# Patient Record
Sex: FEMALE | Race: WHITE | Employment: UNEMPLOYED
[De-identification: names, ages, dates, MRNs, and addresses within clinical notes are randomized per-mention and may not be internally consistent; named-entity substitution may affect disease eponyms.]

---

## 2017-09-20 ENCOUNTER — HOSPITAL ENCOUNTER (OUTPATIENT)
Dept: MRI IMAGING | Facility: CLINIC | Age: 62
Discharge: HOME | End: 2017-09-20
Payer: COMMERCIAL

## 2017-09-20 DIAGNOSIS — R52 PAIN: ICD-10-CM

## 2017-09-20 PROCEDURE — 73221 MRI JOINT UPR EXTREM W/O DYE: CPT

## 2020-12-10 ENCOUNTER — OFFICE VISIT (OUTPATIENT)
Dept: PRIMARY CARE CLINIC | Age: 65
End: 2020-12-10
Payer: MEDICARE

## 2020-12-10 VITALS
SYSTOLIC BLOOD PRESSURE: 128 MMHG | HEART RATE: 90 BPM | WEIGHT: 212.6 LBS | TEMPERATURE: 98.2 F | BODY MASS INDEX: 39.12 KG/M2 | OXYGEN SATURATION: 98 % | DIASTOLIC BLOOD PRESSURE: 78 MMHG | HEIGHT: 62 IN

## 2020-12-10 PROBLEM — J30.1 NON-SEASONAL ALLERGIC RHINITIS DUE TO POLLEN: Status: ACTIVE | Noted: 2020-12-10

## 2020-12-10 PROBLEM — J45.20 MILD INTERMITTENT ASTHMA WITHOUT COMPLICATION: Status: ACTIVE | Noted: 2020-12-10

## 2020-12-10 PROBLEM — M50.30 DDD (DEGENERATIVE DISC DISEASE), CERVICAL: Status: ACTIVE | Noted: 2020-12-10

## 2020-12-10 PROCEDURE — 1090F PRES/ABSN URINE INCON ASSESS: CPT | Performed by: NURSE PRACTITIONER

## 2020-12-10 PROCEDURE — 1036F TOBACCO NON-USER: CPT | Performed by: NURSE PRACTITIONER

## 2020-12-10 PROCEDURE — G8419 CALC BMI OUT NRM PARAM NOF/U: HCPCS | Performed by: NURSE PRACTITIONER

## 2020-12-10 PROCEDURE — G8484 FLU IMMUNIZE NO ADMIN: HCPCS | Performed by: NURSE PRACTITIONER

## 2020-12-10 PROCEDURE — 3017F COLORECTAL CA SCREEN DOC REV: CPT | Performed by: NURSE PRACTITIONER

## 2020-12-10 PROCEDURE — 99203 OFFICE O/P NEW LOW 30 MIN: CPT | Performed by: NURSE PRACTITIONER

## 2020-12-10 PROCEDURE — G8400 PT W/DXA NO RESULTS DOC: HCPCS | Performed by: NURSE PRACTITIONER

## 2020-12-10 PROCEDURE — G8427 DOCREV CUR MEDS BY ELIG CLIN: HCPCS | Performed by: NURSE PRACTITIONER

## 2020-12-10 PROCEDURE — 1123F ACP DISCUSS/DSCN MKR DOCD: CPT | Performed by: NURSE PRACTITIONER

## 2020-12-10 PROCEDURE — 4040F PNEUMOC VAC/ADMIN/RCVD: CPT | Performed by: NURSE PRACTITIONER

## 2020-12-10 RX ORDER — HYDROCHLOROTHIAZIDE 25 MG/1
1 TABLET ORAL DAILY
COMMUNITY
Start: 2020-09-22 | End: 2021-11-18

## 2020-12-10 RX ORDER — AZELASTINE HCL 205.5 UG/1
1 SPRAY NASAL DAILY
Qty: 30 ML | Refills: 3 | Status: SHIPPED | OUTPATIENT
Start: 2020-12-10 | End: 2021-07-06 | Stop reason: SDUPTHER

## 2020-12-10 RX ORDER — AZELASTINE HCL 205.5 UG/1
1 SPRAY NASAL DAILY
COMMUNITY
End: 2020-12-10 | Stop reason: SDUPTHER

## 2020-12-10 RX ORDER — MELOXICAM 15 MG/1
1 TABLET ORAL DAILY
COMMUNITY
Start: 2020-09-24 | End: 2021-07-06

## 2020-12-10 RX ORDER — TIZANIDINE 2 MG/1
1 TABLET ORAL DAILY PRN
COMMUNITY
Start: 2020-11-25 | End: 2020-12-10

## 2020-12-10 RX ORDER — ALUMINUM ZIRCONIUM TRICHLOROHYDREX GLY 0.19 G/G
1 STICK TOPICAL DAILY
COMMUNITY
Start: 2020-12-03 | End: 2021-03-04

## 2020-12-10 RX ORDER — TIZANIDINE 2 MG/1
2 TABLET ORAL NIGHTLY PRN
Qty: 30 TABLET | Refills: 0 | Status: SHIPPED | OUTPATIENT
Start: 2020-12-10 | End: 2021-01-15

## 2020-12-10 RX ORDER — HYDROCODONE BITARTRATE AND ACETAMINOPHEN 5; 325 MG/1; MG/1
1 TABLET ORAL DAILY PRN
COMMUNITY
Start: 2020-11-25 | End: 2021-03-16 | Stop reason: SDUPTHER

## 2020-12-10 RX ORDER — LISINOPRIL 5 MG/1
1 TABLET ORAL DAILY
COMMUNITY
Start: 2020-11-11 | End: 2021-06-11

## 2020-12-10 RX ORDER — MONTELUKAST SODIUM 10 MG/1
1 TABLET ORAL DAILY
COMMUNITY
Start: 2020-10-14 | End: 2021-04-12

## 2020-12-10 RX ORDER — FLUTICASONE PROPIONATE 50 MCG
1 SPRAY, SUSPENSION (ML) NASAL DAILY
Qty: 3 BOTTLE | Refills: 3 | Status: SHIPPED | OUTPATIENT
Start: 2020-12-10 | End: 2022-01-25 | Stop reason: SDUPTHER

## 2020-12-10 RX ORDER — FLUTICASONE PROPIONATE 50 MCG
1 SPRAY, SUSPENSION (ML) NASAL DAILY
COMMUNITY
End: 2020-12-10 | Stop reason: SDUPTHER

## 2020-12-10 ASSESSMENT — PATIENT HEALTH QUESTIONNAIRE - PHQ9
SUM OF ALL RESPONSES TO PHQ QUESTIONS 1-9: 0
SUM OF ALL RESPONSES TO PHQ QUESTIONS 1-9: 0
1. LITTLE INTEREST OR PLEASURE IN DOING THINGS: 0
2. FEELING DOWN, DEPRESSED OR HOPELESS: 0
SUM OF ALL RESPONSES TO PHQ9 QUESTIONS 1 & 2: 0
SUM OF ALL RESPONSES TO PHQ QUESTIONS 1-9: 0

## 2020-12-10 NOTE — PATIENT INSTRUCTIONS
heating pad on your skin. · Ask your doctor if you can take an over-the-counter pain medicine. These include acetaminophen (such as Tylenol) and nonsteroidal anti-inflammatory drugs, such as ibuprofen or naproxen. Your doctor can prescribe stronger medicines if needed. Be safe with medicines. Read and follow all instructions on the label. · Get some exercise every day. Exercise is one of the best ways to help your back feel better and stay better. It's best to start each exercise slowly. You may notice a little soreness, and that's okay. But if an exercise makes your pain worse, stop doing it. Here are things you can try:  ? Walking. It's the simplest and maybe the best activity for your back. It gets your blood moving and helps your muscles stay strong. ? Exercises that gently stretch and strengthen your stomach, back, and leg muscles. The stronger those muscles are, the better they're able to protect your back. Follow-up care is a key part of your treatment and safety. Be sure to make and go to all appointments, and call your doctor if you are having problems. It's also a good idea to know your test results and keep a list of the medicines you take. Where can you learn more? Go to https://Ulthera.[employer]. org and sign in to your [employer] account. Enter X386 in the [facility] box to learn more about \"Learning About Degenerative Disc Disease. \"     If you do not have an account, please click on the \"Sign Up Now\" link. Current as of: March 2, 2020               Content Version: 12.6  © 2006-2020 [employer]. Care instructions adapted under license by [facility] ([facility]). If you have questions about a medical condition or this instruction, always ask your healthcare professional. [place] [address] any warranty or liability for your use of this information.          Patient Education        Cervical Disc Disease: Care Instructions  Your Care Instructions Cervical disc disease results from damage, disease, or wear and tear to the discs between the bones (vertebra) in your neck. The discs act as shock absorbers for the spine and keep the spine flexible. When a disc is damaged, it can bulge out and press against the nerve roots or spinal cord. This is sometimes called a herniated or \"slipped disc. \" This pressure can cause pain and numbness or tingling in your arms and hands. It can also cause weakness in your legs. An accident can damage a disc and cause it to break open (rupture). Aging and hard physical work can also cause damage to cervical discs. The first treatments for cervical disc disease include physical therapy, special neck exercises, heat, and pain medicine. If these fail, your doctor may inject steroids and pain medicine into your neck. Surgery is usually done only if other treatments have not worked. Follow-up care is a key part of your treatment and safety. Be sure to make and go to all appointments, and call your doctor if you are having problems. It's also a good idea to know your test results and keep a list of the medicines you take. How can you care for yourself at home? · Take pain medicines exactly as directed. ? If the doctor gave you a prescription medicine for pain, take it as prescribed. ? If you are not taking a prescription pain medicine, ask your doctor if you can take an over-the-counter medicine. · Don't spend too long in one position. Take short breaks to move around and change positions. · Wear a seat belt and shoulder harness when you are in a car. · Sleep with a pillow under your head and neck that keeps your neck straight. · Follow your doctor's instructions for gentle neck-stretching exercises. · Do not smoke. Smoking can slow healing of your discs. If you need help quitting, talk to your doctor about stop-smoking programs and medicines. These can increase your chances of quitting for good.   · Avoid strenuous work or exercise until your doctor says it is okay. When should you call for help? Call 911 anytime you think you may need emergency care. For example, call if:    · You are unable to move an arm or a leg at all. Call your doctor now or seek immediate medical care if:    · You have new or worse symptoms in your arms, legs, belly, or buttocks. Symptoms may include:  ? Numbness or tingling. ? Weakness. ? Pain.     · You lose bladder or bowel control. Watch closely for changes in your health, and be sure to contact your doctor if:    · You do not get better as expected. Where can you learn more? Go to https://Elevation [employer]. org and sign in to your [employer] account. Enter N118 in the [place] box to learn more about \"Cervical Disc Disease: Care Instructions. \"     If you do not have an account, please click on the \"Sign Up Now\" link. Current as of: March 2, 2020               Content Version: 12.6  © 2006-2020 [employer]. Care instructions adapted under license by [facility] ([facility]). If you have questions about a medical condition or this instruction, always ask your healthcare professional. [address] any warranty or liability for your use of this information.

## 2020-12-10 NOTE — PROGRESS NOTES
Lars Henson [address] [place] [address]  [place] Booth [address]  Dept: [phone]     Patient is here to establish care. Here for evaluation of the following medical concerns: Follow-up (neck pain, since Nov 24,starting to lighten up)    Pooja Wayne is a 76 y.o. female who presents to the office today for a first visit and to establish a relationship with a new primary care provider. Pt here for f.u after ER visit. Records obtained from [facility]. Pt went to ER due to having severe pain on the left side of her neck with muscle spasm and radiation to her left trapezius. CT showed moderate cervical DDD. Pt was given flexeril and norco. Pt has been taking flexeril at night to assist with sleeping and it does seem to help. She isn't taking the norco unless the pain becomes really bad, hasn't had to take it that often. She has been doing at home stretches and avoiding strenuous activity or heavy lifting. I recommend an MRI to further investigate and pt is agreeable. She does need a refill on flexeril which we will provide. Will f.u in a few weeks after MRI is completed. She also is requesting refills of albuterol inhaler she uses PRN and two nasal sprays for allergic rhinitis. Patient Active Problem List    Diagnosis Date Noted    Mild intermittent asthma without complication 75/22/8453    Non-seasonal allergic rhinitis due to pollen 12/10/2020    DDD (degenerative disc disease), cervical 12/10/2020     Patient's BMI is Body mass index is 38.89 kg/m². Social History     Tobacco Use    Smoking status: Never Smoker    Smokeless tobacco: Never Used   Substance Use Topics    Alcohol use: Not Currently    Drug use: Never      History reviewed. No pertinent past medical history. PHQ-9 Total Score: 0 (12/10/2020  1:15 PM)     Immunization:    There is no immunization history on file for this patient. History reviewed. No pertinent surgical history. History reviewed. No pertinent family history. Current Outpatient Medications   Medication Sig Dispense Refill    PRILOSEC OTC 20 MG tablet Take 1 tablet by mouth daily      montelukast (SINGULAIR) 10 MG tablet Take 1 tablet by mouth daily      meloxicam (MOBIC) 15 MG tablet Take 1 tablet by mouth daily      lisinopril (PRINIVIL;ZESTRIL) 5 MG tablet Take 1 tablet by mouth daily      HYDROcodone-acetaminophen (NORCO) 5-325 MG per tablet Take 1 tablet by mouth daily as needed.  hydroCHLOROthiazide (HYDRODIURIL) 25 MG tablet Take 1 tablet by mouth daily      azelastine HCl 0.15 % SOLN 1 spray by Nasal route daily 30 mL 3    fluticasone (FLONASE) 50 MCG/ACT nasal spray 1 spray by Each Nostril route daily 3 Bottle 3    albuterol sulfate (PROAIR RESPICLICK) 306 (90 Base) MCG/ACT aerosol powder inhalation Inhale 2 puffs into the lungs every 4 hours as needed for Wheezing or Shortness of Breath 3 Inhaler 3    tiZANidine (ZANAFLEX) 2 MG tablet Take 1 tablet by mouth nightly as needed (neck pain) 30 tablet 0     No current facility-administered medications for this visit. The patient's past medical, surgical, social, and family history as well as her current medications and allergies were reviewed as documented in today's encounter. Review of Systems   Constitutional: Negative. HENT: Negative. Eyes: Negative. Respiratory: Negative. Cardiovascular: Negative. Gastrointestinal: Negative. Endocrine: Negative. Genitourinary: Negative. Musculoskeletal: Positive for neck pain. Skin: Negative. Allergic/Immunologic: Positive for environmental allergies. Neurological: Negative. Hematological: Negative. Psychiatric/Behavioral: Negative. All other systems reviewed and are negative.      /78 (Site: Left Upper Arm, Position: Sitting, Cuff Size: Large Adult)   Pulse 90   Temp 98.2 °F (36.8 °C) (Temporal)   Ht 5' 2\" (1.575 m)   Wt 212 lb 9.6 oz (96.4 kg)   SpO2 98%   BMI 38.89 kg/m²   Physical Exam  Vitals signs and nursing note reviewed. Constitutional:       Appearance: Normal appearance. HENT:      Right Ear: Tympanic membrane, ear canal and external ear normal.      Left Ear: Tympanic membrane, ear canal and external ear normal.      Nose: Nose normal.      Mouth/Throat:      Mouth: Mucous membranes are moist.      Pharynx: No oropharyngeal exudate. Eyes:      Extraocular Movements: Extraocular movements intact. Conjunctiva/sclera: Conjunctivae normal.      Pupils: Pupils are equal, round, and reactive to light. Neck:      Musculoskeletal: Normal range of motion. Cardiovascular:      Rate and Rhythm: Normal rate and regular rhythm. Pulses: Normal pulses. Heart sounds: Normal heart sounds. Pulmonary:      Effort: Pulmonary effort is normal. No respiratory distress. Breath sounds: Normal breath sounds. No stridor. No wheezing. Abdominal:      General: Abdomen is flat. Bowel sounds are normal.      Palpations: Abdomen is soft. Tenderness: There is no abdominal tenderness. Musculoskeletal:      Cervical back: She exhibits decreased range of motion, bony tenderness and pain. Skin:     General: Skin is warm and dry. Capillary Refill: Capillary refill takes less than 2 seconds. Neurological:      General: No focal deficit present. Mental Status: She is alert. Psychiatric:         Mood and Affect: Mood normal.         Behavior: Behavior normal.         Thought Content:  Thought content normal.       No results found for: WBC, HGB, HCT, MCV, PLT  No results found for: NA, K, CL, CO2, BUN, CREATININE, GLUCOSE, CALCIUM   No results found for: ALT, AST, GGT, ALKPHOS, BILITOT  No results found for: TSHFT4, TSH  No results found for: CHOL  No results found for: TRIG  No results found for: HDL  No results found for: LDLCALC, LDLCHOLESTEROL  No results found for: CHOLHDLRATIO  No results found for: LABA1C  No results found for: FYDTAMLM69  No results found for: FOLATE  No results found for: IRON, TIBC, FERRITIN  No results found for: VITD25  I personally reviewed testing with patient. Otherwise labs within normal limits  ASSESSMENT AND PLAN  1. Mild intermittent asthma without complication    - albuterol sulfate (PROAIR RESPICLICK) 607 (90 Base) MCG/ACT aerosol powder inhalation; Inhale 2 puffs into the lungs every 4 hours as needed for Wheezing or Shortness of Breath  Dispense: 3 Inhaler; Refill: 3    2. Non-seasonal allergic rhinitis due to pollen    - azelastine HCl 0.15 % SOLN; 1 spray by Nasal route daily  Dispense: 30 mL; Refill: 3  - fluticasone (FLONASE) 50 MCG/ACT nasal spray; 1 spray by Each Nostril route daily  Dispense: 3 Bottle; Refill: 3    3. DDD (degenerative disc disease), cervical    - MRI CERVICAL SPINE WO CONTRAST; Future  - tiZANidine (ZANAFLEX) 2 MG tablet; Take 1 tablet by mouth nightly as needed (neck pain)  Dispense: 30 tablet;  Refill: 0     Orders Placed This Encounter   Procedures    MRI CERVICAL SPINE WO CONTRAST     Standing Status:   Future     Standing Expiration Date:   12/10/2021     Orders Placed This Encounter   Medications    azelastine HCl 0.15 % SOLN     Si spray by Nasal route daily     Dispense:  30 mL     Refill:  3    fluticasone (FLONASE) 50 MCG/ACT nasal spray     Si spray by Each Nostril route daily     Dispense:  3 Bottle     Refill:  3    albuterol sulfate (PROAIR RESPICLICK) 843 (90 Base) MCG/ACT aerosol powder inhalation     Sig: Inhale 2 puffs into the lungs every 4 hours as needed for Wheezing or Shortness of Breath     Dispense:  3 Inhaler     Refill:  3    tiZANidine (ZANAFLEX) 2 MG tablet     Sig: Take 1 tablet by mouth nightly as needed (neck pain)     Dispense:  30 tablet     Refill:  0      Data Unavailable  Health Maintenance Due   Topic Date Due    Potassium monitoring  1955    Creatinine monitoring  1955    Hepatitis C screen  1955    DTaP/Tdap/Td vaccine (1 -

## 2020-12-11 ASSESSMENT — ENCOUNTER SYMPTOMS
GASTROINTESTINAL NEGATIVE: 1
RESPIRATORY NEGATIVE: 1
EYES NEGATIVE: 1

## 2020-12-14 ENCOUNTER — HOSPITAL ENCOUNTER (OUTPATIENT)
Dept: MRI IMAGING | Facility: CLINIC | Age: 65
Discharge: HOME | End: 2020-12-16
Payer: MEDICARE

## 2020-12-14 PROCEDURE — 72141 MRI NECK SPINE W/O DYE: CPT

## 2020-12-16 ENCOUNTER — TELEPHONE (OUTPATIENT)
Dept: PRIMARY CARE CLINIC | Age: 65
End: 2020-12-16

## 2020-12-16 RX ORDER — ALBUTEROL SULFATE 2.5 MG/3ML
2.5 SOLUTION RESPIRATORY (INHALATION) EVERY 6 HOURS PRN
Qty: 120 EACH | Refills: 3 | Status: SHIPPED | OUTPATIENT
Start: 2020-12-16

## 2020-12-17 RX ORDER — ALBUTEROL SULFATE 90 UG/1
2 AEROSOL, METERED RESPIRATORY (INHALATION) EVERY 6 HOURS PRN
Qty: 3 INHALER | Refills: 0 | Status: SHIPPED | OUTPATIENT
Start: 2020-12-17

## 2020-12-17 RX ORDER — ALBUTEROL SULFATE 90 UG/1
2 AEROSOL, METERED RESPIRATORY (INHALATION) EVERY 6 HOURS PRN
COMMUNITY
End: 2020-12-17 | Stop reason: SDUPTHER

## 2020-12-17 NOTE — TELEPHONE ENCOUNTER
Verified with Dr. Farhat Huang the approval to send the correct inhaler, nebulizer solution was accidentally order last visit

## 2020-12-18 ENCOUNTER — HOSPITAL ENCOUNTER (OUTPATIENT)
Dept: PHYSICAL THERAPY | Facility: CLINIC | Age: 65
Setting detail: THERAPIES SERIES
Discharge: HOME | End: 2020-12-18
Payer: MEDICARE

## 2020-12-18 PROCEDURE — 97161 PT EVAL LOW COMPLEX 20 MIN: CPT

## 2020-12-18 PROCEDURE — 97140 MANUAL THERAPY 1/> REGIONS: CPT

## 2020-12-18 PROCEDURE — 97110 THERAPEUTIC EXERCISES: CPT

## 2020-12-18 NOTE — PRE-CERTIFICATION NOTE
Medicare Cap   [] Physical Therapy  [] Speech Therapy  [] Occupational therapy  *PT and Speech caps combine      $2040 Cap limit < kx modifier needed        Patient Name: Sarah Peralta  YOB: 1955    Note:  This is an estimate of charges billed.      Date of [address] Name # units/ charge $$$ charge Daily Total Charge Ongoing Total $$$   12/18 PT eval  Therex  Manual 1+1+1 82.82+23.22+21.70 127.74 127.74

## 2020-12-18 NOTE — PRE-CERTIFICATION NOTE
Jovan Fall Risk Assessment    Patient Name:  Sheyla Bennett  : 1955        Risk Factor Scale  Score   History of Falls [] Yes  [x] No 25  0    Secondary Diagnosis [] Yes  [x] No 15  0    Ambulatory Aid [] Furniture  [] Crutches/cane/walker  [x] None/bedrest/wheelchair/nurse 30  15  0    IV/Heparin Lock [] Yes  [x] No 20  0    Gait/Transferring [] Impaired  [] Weak  [x] Normal/bedrest/immobile 20  10  0    Mental Status [] Forgets limitations  [x] Oriented to own ability 15  0       Total: 0     Based on the Assessment score: check the appropriate box.     [x]  No intervention needed   Low =   Score of 0-24    []  Use standard prevention interventions Moderate =  Score of 24-44   [] Give patient handout and discuss fall prevention strategies   [] Establish goal of education for patient/family RE: fall prevention strategies    []  Use high risk prevention interventions High = Score of 45 and higher   [] Give patient handout and discuss fall prevention strategies   [] Establish goal of education for patient/family Re: fall prevention strategies   [] Discuss lifeline / other resources    Electronically signed by:   Linda Castillo, PT  Date: 2020

## 2020-12-18 NOTE — CONSULTS
[] [address]  [address]  [phone]  F: [phone] [] [address]  [facility]   [address]  P: [phone]  F: [phone] [] [address]  [facility]  P: [phone]  F: [phone] [] [address]  [facility]   [address]   [place]: [phone]  F: [phone]  [x] [address]  P: [phone]  F: [phone]      Physical Therapy Spine Evaluation    Date:  2020  Patient: Humberto Davis  :  1955  MRN: [id number]  Physician: Penny Ulrich CNP   Insurance: Medicare  Medical Diagnosis: DDD Cervical Spine  Rehab Codes: M50.30  Onset date: ~20  Next Dr's appt. : 3 month follow     Subjective:   CC: Pt states pain had been intermittment for months, until one day in November when the pain was so intense pt went to the ER. Pt received muscle relaxors which significantly improved pain, however constant pain did remain. Pt went to PCP who had MRI ordered and sent to PT. Pt also reports numbness/tinging into L shoulder, does not radiate into forearm or hands.          PMHx: [] Unremarkable [] Diabetes [] HTN  [] Pacemaker   [] MI/Heart Problems [] Cancer [] Arthritis [] Other:              [] Refer to full medical chart  In EPIC         Tests: [] X-Ray: [x] MRI:   Multilevel degenerative disc disease with mild to moderate right foraminal   narrowing at C5-6.       [] Other:    Medications: [] Refer to full medical record [] None [] Other:  Allergies:      [] Refer to full medical record [] None [] Other:    Function:      Marital Status    Home type    Stairs from outside            Hand rail    Stairs inside            Hand rail    Employement Homemaker    Job status    Work Activities/duties     Recreational Activities Difficulty with cooking d/t prolonged standing        Pain present? Yes    Location L side cervical    Pain Rating currently 2-3/10   Pain at worse 10/10   Pain at best 1/10   Description of pain Constant ache with shooting intermittmently    Altered Sensation N/T at times   What makes it worse Prolonged standing, vacuuming    What makes it better Rest, heat   Symptom progression Improving    Sleep Difficult to find a comfortable position                Objective:      STRENGTH  ROM    Left Right Cervical    C5 Shld Abd 4/5 pain 5/5 Flexion WL   Shld Flexion 4+/5 5/5 Extension Limited 75% pain   Shld IR 5/5 5/5 Rotation L Limited 25% pain R WFL   Shld ER 5/5 5/5 Sidebend L Limited 25% R Limited 25%         OBSERVATION No Deficit Deficit Not Tested Comments   Posture       Forward Head [] [] []    Rounded Shoulders [] [] []    Kyphosis [] [] []    Lordosis [] [] []    Lateral Shift [] [] []    Scoliosis [] [] []    Iliac Crest [] [] []    PSIS [] [] []    ASIS [] [] []    Genu Valgus [] [] []    Genu Varus [] [] []    Genu Recurvatum [] [] []    Pronation [] [] []    Supination [] [] []    Leg Length Discrp [] [] []    Slumped Sitting [] [] []    Palpation [] [x] [] Significant tenderness with palpation noted in LUT into L levator   Sensation [] [x] [] No deficits currently, however does report intermittent n/t L UT   Edema [] [] []    Neurological [] [] []              Somatic Dysfunctions Normal Deficit Details   Cervical   [] [x] Difficult to access somatic dysfunction d/t lack of extension and body habitus. TTP C4/C5  Suboccipital release    Thoracic   [] []    Rib   [] []    Pelvis   [] []    Lumbar [] []    SI   [] []      Functional Test: Neck Disability Index Score: 27.5% functionally impaired         Assessment:  Patient would benefit from skilled physical therapy services in order to: Decrease UT and levator tightness in order to decrease cervical pain. *UT stretch 3x30\"     *Levator stretch 3x30\"     *Seated scapular retraction 2x10                                                                 Other: Manual: Subocciptial release, DI L levator     Integrative Dry Needling: L UT, C6/C7 paravertebrals. Homeostatic point: Spinal Accessory. Initiated Dry Needling this date with all risks and benefits explained, no adverse effects noted post.     Specific Instructions for next treatment: Continue with manual as listed above, possible hypervolt as well     Evaluation Complexity:  History (Personal factors, comorbidities) [x] 0 [] 1-2 [] 3+   Exam (limitations, restrictions) [x] 1-2 [] 3 [] 4+   Clinical presentation (progression) [x] Stable [] Evolving  [] Unstable   Decision Making [x] Low [] Moderate [] High    [x] Low Complexity [] Moderate Complexity [] High Complexity       Treatment Charges: Mins Units   [x] Evaluation       [x]  Low       []  Moderate       []  High 20 1   []  Modalities     [x]  Ther Exercise 10 1   [x]  Manual Therapy 15 1   []  Ther Activities     []  Aquatics     []  Vasocompression     [x]  Other: Dry Needling 5 0     TOTAL TREATMENT TIME: 50 minutes    Time in: 0810   Time Out: 900    Electronically signed by: Angela Masters PT        Physician Signature:________________________________Date:__________________  By signing above or cosigning this note, I have reviewed this plan of care and certify a need for medically necessary rehabilitation services.      *PLEASE SIGN ABOVE AND FAX BACK ALL PAGES*

## 2020-12-21 ENCOUNTER — HOSPITAL ENCOUNTER (OUTPATIENT)
Dept: PHYSICAL THERAPY | Facility: CLINIC | Age: 65
Setting detail: THERAPIES SERIES
Discharge: HOME | End: 2020-12-21
Payer: MEDICARE

## 2020-12-21 PROCEDURE — 97140 MANUAL THERAPY 1/> REGIONS: CPT

## 2020-12-21 PROCEDURE — 97110 THERAPEUTIC EXERCISES: CPT

## 2020-12-21 NOTE — FLOWSHEET NOTE
[] [address]  [address]  [phone]  F: [phone] [] [address]  [facility]   [address]  P: [phone]  F: [phone] [] [place]  [address]  P: [phone]  F: [phone] [] [address]  P: [phone]  F: [phone] [] [address]  [facility]   [address]   [place]: [phone]  F: [phone]      Physical Therapy Daily Treatment Note    Date:  2020  Patient Name:  Jakob Manzano    :  1955  MRN: [id number]  Physician: Laci Cantu CNP                                   Insurance: Medicare  Medical Diagnosis: DDD Cervical Spine                  Rehab Codes: M50.30  Onset date: ~20               Next 's appt. : 3 month follow   Visit# / total visits: ; Progress note for Medicare patient due at visit 10     Cancels/No Shows: 0/0    Subjective:    Pain:  [x] Yes  [] No Location: L sided neck Pain Rating: (0-10 scale) 2/10  Pain altered Tx:  [] No  [] Yes  Action:  Comments: Pt arrives stating to having decreased neck pain after initial evaluation. Currently only complaints is stiffness. Objective:  Exercises:  Exercise  L sided cervical radiculopathy     Reps/ Time Weight/ Level Comments                       *UT stretch 3x30\"       *Levator stretch 3x30\"       *Seated scapular retraction 2x10                                                                                                           Other: Manual:  DI L levator, hypervolt to upper thoracic paraspinals unable to tolerate hypervolt to UT or levator d/t pain, UT      Integrative Dry Needling: L UT, C6/C7 paravertebrals.  Homeostatic point: Spinal Accessory, Dorsal Scapular     Specific

## 2020-12-30 ENCOUNTER — HOSPITAL ENCOUNTER (OUTPATIENT)
Dept: PHYSICAL THERAPY | Facility: CLINIC | Age: 65
Setting detail: THERAPIES SERIES
Discharge: HOME | End: 2020-12-30
Payer: MEDICARE

## 2020-12-30 PROCEDURE — 97140 MANUAL THERAPY 1/> REGIONS: CPT

## 2020-12-30 PROCEDURE — 97110 THERAPEUTIC EXERCISES: CPT

## 2020-12-30 NOTE — FLOWSHEET NOTE
[] [address]  [address]  [phone]  F: [phone] [] [address]  [facility] 36   [address]  P: [phone]  F: [phone] [] [address]  [address]  P: [phone]  F: [phone] [] [address]  P: [phone]  F: [phone] [] [address]  [facility]   [address]   [place]: [phone]  F: [phone]      Physical Therapy Daily Treatment Note    Date:  2020  Patient Name:  Zaina Farah    :  1955  MRN: [id number]  Physician: Reese Keller CNP                                   Insurance: Medicare  Medical Diagnosis: DDD Cervical Spine                  Rehab Codes: M50.30  Onset date: ~20               Next 's appt. : 3 month follow   Visit# / total visits: ; Progress note for Medicare patient due at visit 10     Cancels/No Shows: 0/0    Subjective:    Pain:  [x] Yes  [] No Location: L sided neck Pain Rating: (0-10 scale) 2/10  Pain altered Tx:  [] No  [] Yes  Action:  Comments: Pt arrives stating to again having decreased pain, only continues to having stiffness. Objective:  Exercises:  Exercise  L sided cervical radiculopathy     Reps/ Time Weight/ Level Comments                       *UT stretch 3x30\"       *Levator stretch 3x30\"       *Seated scapular retraction 2x10                 Theraband midrow, shoulder ext, horizontal abd 2x10                                                                                       Other: Manual:  DI L levator, hypervolt to upper thoracic paraspinals and L UT, levator with light pressure       Integrative Dry Needling: L UT, C6/C7 paravertebrals.  Homeostatic point: Spinal Accessory     Specific Instructions for next treatment: Continue with manual as listed above, progress postural stabilization strengthening as tolerated. Treatment Charges: Mins Units   []  Modalities     [x]  Ther Exercise 10 1   [x]  Manual Therapy 25 2   []  Ther Activities     []  Aquatics     []  Vasocompression     [x]  Other: Dry Needling 5 0   Total Treatment time 40 3       Assessment: [x] Progressing toward goals. Pt with continued improvement in pain and stiffness post manual this date. Able to increase strengthening via adding theraband scapular retractor strengthening, which pt tolerated without any increased neck soreness. [] No change. [] Other:  [x] Patient would continue to benefit from skilled physical therapy services in order to: Decrease UT and levator tightness in order to decrease cervical pain.            Goals:                               STG: (to be met in 10 treatments)  1. ? Pain: Decrease pain levels to 4/10 at worst  2. ? ROM: Increase flexibility and AROM limitations throughout to equal bilat to reduce difficulty with ADLs, increase cervical extension to only 25% impaired and without pain. 3. ? Strength: Increase L shoulder abd and flex strength to 5/5  4. ? Function: Pt to report increased ability to cook without pain  5. Independent with Home Exercise Programs  6. Demonstrate Knowledge of fall prevention     LTG: (to be met in 20 treatments)  1. Improve score on assessment tool from 27.5% impaired to 10% impaired, demonstrating an improvement in ADLs  2. Reduce pain levels to 0/10    Pt. Education:  [x] Yes  [] No  [] Reviewed Prior HEP/Ed  Method of Education: [x] Verbal  [x] Demo  [x] Written  Comprehension of Education:  [x] Verbalizes understanding. [x] Demonstrates understanding. [x] Needs review. [] Demonstrates/verbalizes HEP/Ed previously given. Plan: [x] Continue current frequency toward long and short term goals.           Time In: 1500            Time Out: 1540    Electronically signed by: Jeffery Nix, PT

## 2021-01-04 ENCOUNTER — HOSPITAL ENCOUNTER (OUTPATIENT)
Dept: PHYSICAL THERAPY | Facility: CLINIC | Age: 66
Setting detail: THERAPIES SERIES
Discharge: HOME | End: 2021-01-04
Payer: MEDICARE

## 2021-01-04 PROCEDURE — 97140 MANUAL THERAPY 1/> REGIONS: CPT

## 2021-01-04 NOTE — FLOWSHEET NOTE
[] [address]  [address]  [phone]  F: [phone] [] [address]  [facility]   [address]  P: [phone]  F: [phone] [] [place]  [address]  P: [phone]  F: [phone] [] [address]  P: [phone]  F: [phone] [] [address]  [facility]   [address]   [place]: [phone]  F: [phone]      Physical Therapy Daily Treatment Note    Date:  2021  Patient Name:  Jacinto Sinclair    :  1955  MRN: [id number]  Physician: Jolene Casarez CNP                                   Insurance: Medicare  Medical Diagnosis: DDD Cervical Spine                  Rehab Codes: M50.30  Onset date: ~20               Next 's appt. : 3 month follow   Visit# / total visits: ; Progress note for Medicare patient due at visit 10     Cancels/No Shows: 0/0    Subjective:    Pain:  [x] Yes  [] No Location: L sided neck Pain Rating: (0-10 scale) 0/10  Pain altered Tx:  [] No  [] Yes  Action:  Comments: Pt arrives stating to having increased soreness after last visit, however pain subsided after two days. Pt currently arrives in no pain.      Objective:  Exercises:  Exercise  L sided cervical radiculopathy     Reps/ Time Weight/ Level Comments   UBE 5'                 *UT stretch 3x30\"       *Levator stretch 3x30\"       *Seated scapular retraction 2x10                 Theraband midrow, shoulder ext, horizontal abd 2x10    held today d/t increased soreness after last visit                                                                                    Other: Manual:  DI L levator, hypervolt to upper thoracic paraspinals and L UT, levator with light pressure       Integrative Dry Needling: L UT, C6/C7 paravertebrals. Homeostatic point: Spinal Accessory     Specific Instructions for next treatment: Continue with manual as listed above, progress postural stabilization strengthening as tolerated. Treatment Charges: Mins Units   []  Modalities     [x]  Ther Exercise 5 0   [x]  Manual Therapy 25 2   []  Ther Activities     []  Aquatics     []  Vasocompression     [x]  Other: Dry Needling 5 0   Total Treatment time 35 2       Assessment: [x] Progressing toward goals. Held strengthening this date d/t pt reporting increased soreness after last visit. Continued with manual and stretching which pt tolerated well with decreased stiffness post.     [] No change. [] Other:  [x] Patient would continue to benefit from skilled physical therapy services in order to: Decrease UT and levator tightness in order to decrease cervical pain.            Goals:                               STG: (to be met in 10 treatments)  1. ? Pain: Decrease pain levels to 4/10 at worst  2. ? ROM: Increase flexibility and AROM limitations throughout to equal bilat to reduce difficulty with ADLs, increase cervical extension to only 25% impaired and without pain. 3. ? Strength: Increase L shoulder abd and flex strength to 5/5  4. ? Function: Pt to report increased ability to cook without pain  5. Independent with Home Exercise Programs  6. Demonstrate Knowledge of fall prevention     LTG: (to be met in 20 treatments)  1. Improve score on assessment tool from 27.5% impaired to 10% impaired, demonstrating an improvement in ADLs  2. Reduce pain levels to 0/10    Pt. Education:  [x] Yes  [] No  [] Reviewed Prior HEP/Ed  Method of Education: [x] Verbal  [x] Demo  [x] Written  Comprehension of Education:  [x] Verbalizes understanding. [x] Demonstrates understanding. [x] Needs review. [] Demonstrates/verbalizes HEP/Ed previously given. Plan: [x] Continue current frequency toward long and short term goals.           Time In: 1

## 2021-01-06 ENCOUNTER — HOSPITAL ENCOUNTER (OUTPATIENT)
Dept: PHYSICAL THERAPY | Facility: CLINIC | Age: 66
Setting detail: THERAPIES SERIES
Discharge: HOME | End: 2021-01-06
Payer: MEDICARE

## 2021-01-06 PROCEDURE — 97140 MANUAL THERAPY 1/> REGIONS: CPT

## 2021-01-06 NOTE — PRE-CERTIFICATION NOTE
Medicare Cap   [] Physical Therapy  [] Speech Therapy  [] Occupational therapy  *PT and Speech caps combine      $2040 Cap limit < kx modifier needed        Patient Name: Jose Daniel Membreno  YOB: 1955    Note:  This is an estimate of charges billed.      Date of [address] Name # units/ charge $$$ charge Daily Total Charge Ongoing Total $$$   12/18 PT eval  Therex  Manual 1+1+1 82.82+23.22+21.70 127.74 127.74   12/21 Therex  Manual 1+1 29.72+21.70 51.44 179.18   12/30 Therex  Manual 2+1 29.72+23.22+21.70 74.64 253.82           1/4/21 Manual 2 29.72+23.22 52.94 52.94   1/6/21 Manual 2 29.72+23.22 52.94 105.88

## 2021-01-06 NOTE — FLOWSHEET NOTE
[] [address]  [address]  [phone]  F: [phone] [] [address]  [facility]   [address]  P: [phone]  F: [phone] [] [place]  [address]  P: [phone]  F: [phone] [] [address]  P: [phone]  F: [phone] [] [address]  [facility]   [address]   [place]: [phone]  F: [phone]      Physical Therapy Daily Treatment Note    Date:  2021  Patient Name:  Laura Fregoso    :  1955  MRN: [id number]  Physician: Gwyn Best CNP                                   Insurance: Medicare  Medical Diagnosis: DDD Cervical Spine                  Rehab Codes: M50.30  Onset date: ~20               Next 's appt. : 3 month follow   Visit# / total visits: ; Progress note for Medicare patient due at visit 10     Cancels/No Shows: 0/0    Subjective:    Pain:  [x] Yes  [] No Location:  N/A   Pain Rating: (0-10 scale) 0/10  Pain altered Tx:  [] No  [] Yes  Action:  Comments: Pt arrives stating to feeling great after last visit, and having no soreness. Pt also arrives without any pain currently.      Objective:  Exercises:  Exercise  L sided cervical radiculopathy     Reps/ Time Weight/ Level Comments   UBE 5'                 *UT stretch 3x30\"       *Levator stretch 3x30\"       *Seated scapular retraction 2x10                 Theraband midrow, shoulder ext, horizontal abd 2x10    hold              Standing active shoulder flex and horiztonal abd 2x10    cues to decrease ROM in order to avoid UT compensation with good follow through                                                                Other: Manual:  DI L levator and UT, hypervolt to upper thoracic paraspinals and L UT, levator with light pressure       Integrative Dry Needling: L UT, C6/C7 paravertebrals. Homeostatic point: Spinal Accessory     Specific Instructions for next treatment: Continue with manual as listed above, progress postural stabilization strengthening as tolerated. Treatment Charges: Mins Units   []  Modalities     [x]  Ther Exercise 5 0   [x]  Manual Therapy 25 2   []  Ther Activities     []  Aquatics     []  Vasocompression     [x]  Other: Dry Needling 5 0   Total Treatment time 35 2       Assessment: [x] Progressing toward goals. Reintegrated gentle stretnghtnign this date in order progress postural stabilization exercises. Pt able to complete all exercises as AROM, noting minor bilateral shoulder muscle soreness for last set of horizontal abduction, however no increase in neck pain noted. Will continue with manual and progress strengthening as tolerated. [] No change. [] Other:  [x] Patient would continue to benefit from skilled physical therapy services in order to: Decrease UT and levator tightness in order to decrease cervical pain.            Goals:                               STG: (to be met in 10 treatments)  1. ? Pain: Decrease pain levels to 4/10 at worst  2. ? ROM: Increase flexibility and AROM limitations throughout to equal bilat to reduce difficulty with ADLs, increase cervical extension to only 25% impaired and without pain. 3. ? Strength: Increase L shoulder abd and flex strength to 5/5  4. ? Function: Pt to report increased ability to cook without pain  5. Independent with Home Exercise Programs  6. Demonstrate Knowledge of fall prevention     LTG: (to be met in 20 treatments)  1. Improve score on assessment tool from 27.5% impaired to 10% impaired, demonstrating an improvement in ADLs  2. Reduce pain levels to 0/10    Pt.  Education:  [x] Yes  [] No  [] Reviewed Prior HEP/Ed  Method of Education: [x] Verbal  [x] Demo  [x] Written  Comprehension of Education:  [x] Verbalizes understanding. [x] Demonstrates understanding. [x] Needs review. [] Demonstrates/verbalizes HEP/Ed previously given. Plan: [x] Continue current frequency toward long and short term goals.           Time In: 1000            Time Out:  1035    Electronically signed by:  Daniele Stanley, PT

## 2021-01-11 ENCOUNTER — HOSPITAL ENCOUNTER (OUTPATIENT)
Dept: PHYSICAL THERAPY | Facility: CLINIC | Age: 66
Setting detail: THERAPIES SERIES
Discharge: HOME | End: 2021-01-11
Payer: MEDICARE

## 2021-01-11 PROCEDURE — 97140 MANUAL THERAPY 1/> REGIONS: CPT

## 2021-01-11 NOTE — FLOWSHEET NOTE
[] [address]  [address]  [phone]  F: [phone] [] [address]  [facility] 36   [address]  P: [phone]  F: [phone] [] [address]  [address]  P: [phone]  F: [phone] [] [address]  P: [phone]  F: [phone] [] [address]  [facility]   [address]   [place]: [phone]  F: [phone]      Physical Therapy Daily Treatment Note    Date:  2021  Patient Name:  Lady Dunlap    :  1955  MRN: [id number]  Physician: Tanner Mauricio CNP                                   Insurance: Medicare  Medical Diagnosis: DDD Cervical Spine                  Rehab Codes: M50.30  Onset date: ~20               Next 's appt. : 3 month follow   Visit# / total visits: ; Progress note for Medicare patient due at visit 10     Cancels/No Shows: 0/0    Subjective:    Pain:  [x] Yes  [] No Location:  N/A   Pain Rating: (0-10 scale) 0/10  Pain altered Tx:  [] No  [] Yes  Action:  Comments: Pt again arrives with no pain, voices satisfaction with therapy so far.      Objective:  Exercises:  Exercise  L sided cervical radiculopathy     Reps/ Time Weight/ Level Comments   UBE 5'    not today              *UT stretch 3x30\"       *Levator stretch 3x30\"       *Seated scapular retraction 2x10                 Theraband midrow, shoulder ext, horizontal abd 2x10    hold              Standing active shoulder flex and horiztonal abd 2x10    cues to decrease ROM in order to avoid UT compensation with good follow through                                                                Other: Manual:  DI L levator and UT, hypervolt to upper thoracic paraspinals and L UT, levator with light pressure - understanding. [x] Demonstrates understanding. [x] Needs review. [] Demonstrates/verbalizes HEP/Ed previously given. Plan: [x] Continue current frequency toward long and short term goals.           Time In: 0896            Time Out:  1030    Electronically signed by:  Daniele Stanley, PT

## 2021-01-13 ENCOUNTER — APPOINTMENT (OUTPATIENT)
Dept: PHYSICAL THERAPY | Facility: CLINIC | Age: 66
End: 2021-01-13
Payer: MEDICARE

## 2021-01-15 DIAGNOSIS — M50.30 DDD (DEGENERATIVE DISC DISEASE), CERVICAL: ICD-10-CM

## 2021-01-15 RX ORDER — TIZANIDINE 2 MG/1
TABLET ORAL
Qty: 30 TABLET | Refills: 0 | Status: SHIPPED | OUTPATIENT
Start: 2021-01-15 | End: 2021-01-19

## 2021-01-15 NOTE — TELEPHONE ENCOUNTER
Health Maintenance   Topic Date Due    Potassium monitoring  1955    Creatinine monitoring  1955    Hepatitis C screen  1955    DTaP/Tdap/Td vaccine (1 - Tdap) 01/10/1973    Lipid screen  01/10/1994    Diabetes screen  01/10/1994    Breast cancer screen  01/10/2004    Colon cancer screen colonoscopy  01/10/2004    DEXA (modify frequency per FRAX score)  01/10/2009    Annual Wellness Visit (AWV)  12/10/2020    Pneumococcal 65+ years Vaccine (2 of 2 - PPSV23) 01/11/2022    Flu vaccine  Completed    Shingles Vaccine  Completed    Hepatitis A vaccine  Aged Out    Hepatitis B vaccine  Aged Out    Hib vaccine  Aged Out    Meningococcal (ACWY) vaccine  Aged Out             (applicable per patient's age: Cancer Screenings, Depression Screening, Fall Risk Screening, Immunizations)    No results found for: LABA1C, LABMICR, LDLCHOLESTEROL, LDLCALC, AST, ALT, BUN   (goal A1C is < 7)   (goal LDL is <100) need 30-50% reduction from baseline     BP Readings from Last 3 Encounters:   12/10/20 128/78    (goal /80)      All Future Testing planned in [facility]:      Next Visit Date:  Future Appointments   Date Time Provider [place]   9/77/0244 25:19 AM Cliff Ramos PT BOB GORDON PT Moe Franco            Patient Active Problem List:     Mild intermittent asthma without complication     Non-seasonal allergic rhinitis due to pollen     DDD (degenerative disc disease), cervical

## 2021-01-18 ENCOUNTER — HOSPITAL ENCOUNTER (OUTPATIENT)
Dept: PHYSICAL THERAPY | Facility: CLINIC | Age: 66
Setting detail: THERAPIES SERIES
Discharge: HOME | End: 2021-01-18
Payer: MEDICARE

## 2021-01-18 ENCOUNTER — TELEPHONE (OUTPATIENT)
Dept: PHYSICAL THERAPY | Facility: CLINIC | Age: 66
End: 2021-01-18

## 2021-01-18 PROCEDURE — 97140 MANUAL THERAPY 1/> REGIONS: CPT

## 2021-01-18 NOTE — FLOWSHEET NOTE
[] [address]  [address]  [phone]  F: [phone] [] [address]  [facility] 36   [address]  P: [phone]  F: [phone] [] [address]  [address] [facility] [facility]  P: [phone]  F: [phone] [] [address]  P: [phone]  F: [phone] [] [address]  [facility]   [address]   [place]: [phone]  F: [phone]      Physical Therapy Daily Treatment Note    Date:  2021  Patient Name:  Elvia Campa    :  1955  MRN: [id number]  Physician: Guanaco Townsend CNP                                   Insurance: Medicare  Medical Diagnosis: DDD Cervical Spine                  Rehab Codes: M50.30  Onset date: ~20               Next 's appt. : 3 month follow   Visit# / total visits: ; Progress note for Medicare patient due at visit 10     Cancels/No Shows: 0/0    Subjective:    Pain:  [x] Yes  [] No Location:  N/A   Pain Rating: (0-10 scale) 0/10  Pain altered Tx:  [] No  [] Yes  Action:  Comments: Pt arrives stating to having a slight increase in soreness after last visit, which subsided after a few days. Arrives with no pain.      Objective:  Exercises:  Exercise  L sided cervical radiculopathy     Reps/ Time Weight/ Level Comments   UBE 5'    not today              *UT stretch 3x30\"       *Levator stretch 3x30\"       *Seated scapular retraction 2x10                 Theraband midrow, shoulder ext, horizontal abd 2x10    hold              Standing active shoulder flex and horiztonal abd 2x10    cues to decrease ROM in order to avoid UT compensation with good follow through- not today                                                              Other: Manual:  DI L levator and UT, hypervolt to upper thoracic paraspinals and L UT, levator with light pressure - no hypervolt today d/t pt stating too painful      Integrative Dry Needling: L UT, C6/C7 paravertebrals. Homeostatic point: Spinal Accessory     Specific Instructions for next treatment: Continue with manual as listed above, progress postural stabilization strengthening as tolerated. Treatment Charges: Mins Units   []  Modalities     [x]  Ther Exercise 5 0   [x]  Manual Therapy 25 2   []  Ther Activities     []  Aquatics     []  Vasocompression     [x]  Other: Dry Needling 5 0   Total Treatment time 35 2       Assessment: [x] Progressing toward goals. Pt arrived with no pain, however d/t sorenss afer last visit held exercises and focused on manual and stretching. Possible DC next visit if pt arrives without pain. [] No change. [] Other:  [x] Patient would continue to benefit from skilled physical therapy services in order to: Decrease UT and levator tightness in order to decrease cervical pain.            Goals:                               STG: (to be met in 10 treatments)  1. ? Pain: Decrease pain levels to 4/10 at worst  2. ? ROM: Increase flexibility and AROM limitations throughout to equal bilat to reduce difficulty with ADLs, increase cervical extension to only 25% impaired and without pain. 3. ? Strength: Increase L shoulder abd and flex strength to 5/5  4. ? Function: Pt to report increased ability to cook without pain  5. Independent with Home Exercise Programs  6. Demonstrate Knowledge of fall prevention     LTG: (to be met in 20 treatments)  1. Improve score on assessment tool from 27.5% impaired to 10% impaired, demonstrating an improvement in ADLs  2. Reduce pain levels to 0/10    Pt. Education:  [x] Yes  [] No  [] Reviewed Prior HEP/Ed  Method of Education: [x] Verbal  [x] Demo  [x] Written  Comprehension of Education:  [x] Verbalizes understanding. [x] Demonstrates understanding. [x] Needs review. [] Demonstrates/verbalizes HEP/Ed previously given. Plan: [x] Continue current frequency toward long and short term goals.           Time In: 1100            Time Out:  1135    Electronically signed by:  Marta Benson PT

## 2021-01-19 DIAGNOSIS — M50.30 DDD (DEGENERATIVE DISC DISEASE), CERVICAL: ICD-10-CM

## 2021-01-19 RX ORDER — TIZANIDINE 2 MG/1
TABLET ORAL
Qty: 30 TABLET | Refills: 0 | Status: SHIPPED | OUTPATIENT
Start: 2021-01-19 | End: 2021-03-04

## 2021-01-19 NOTE — TELEPHONE ENCOUNTER
Health Maintenance   Topic Date Due    Potassium monitoring  1955    Creatinine monitoring  1955    Hepatitis C screen  1955    DTaP/Tdap/Td vaccine (1 - Tdap) 01/10/1973    Lipid screen  01/10/1994    Diabetes screen  01/10/1994    Breast cancer screen  01/10/2004    Colon cancer screen colonoscopy  01/10/2004    DEXA (modify frequency per FRAX score)  01/10/2009    Annual Wellness Visit (AWV)  12/10/2020    Pneumococcal 65+ years Vaccine (2 of 2 - PPSV23) 01/11/2022    Flu vaccine  Completed    Shingles Vaccine  Completed    Hepatitis A vaccine  Aged Out    Hepatitis B vaccine  Aged Out    Hib vaccine  Aged Out    Meningococcal (ACWY) vaccine  Aged Out             (applicable per patient's age: Cancer Screenings, Depression Screening, Fall Risk Screening, Immunizations)    No results found for: LABA1C, LABMICR, LDLCHOLESTEROL, LDLCALC, AST, ALT, BUN   (goal A1C is < 7)   (goal LDL is <100) need 30-50% reduction from baseline     BP Readings from Last 3 Encounters:   12/10/20 128/78    (goal /80)      All Future Testing planned in [facility]:      Next Visit Date:  Future Appointments   Date Time Provider Kvng [place]   6/63/3954 58:40 AM Sharron Duncan PT [facility] Peters            Patient Active Problem List:     Mild intermittent asthma without complication     Non-seasonal allergic rhinitis due to pollen     DDD (degenerative disc disease), cervical

## 2021-01-25 ENCOUNTER — HOSPITAL ENCOUNTER (OUTPATIENT)
Dept: PHYSICAL THERAPY | Facility: CLINIC | Age: 66
Setting detail: THERAPIES SERIES
Discharge: HOME | End: 2021-01-25
Payer: MEDICARE

## 2021-01-25 PROCEDURE — 97140 MANUAL THERAPY 1/> REGIONS: CPT

## 2021-01-25 NOTE — FLOWSHEET NOTE
[] [address]  [address]  [phone]  F: [phone] [] [address]  [facility]   [address]  P: [phone]  F: [phone] [] [address]  [address] [facility]  P: [phone]  F: [phone] [] [address]  P: [phone]  F: [phone] [] [address]  [facility]   [address]   [place]: [phone]  F: [phone]      Physical Therapy Daily Treatment Note    Date:  2021  Patient Name:  Lg Medrano    :  1955  MRN: [id number]  Physician: Malcolm Jorge CNP                                   Insurance: Medicare  Medical Diagnosis: DDD Cervical Spine                  Rehab Codes: M50.30  Onset date: ~20               Next 's appt. : 3 month follow   Visit# / total visits: ; Progress note for Medicare patient due at visit 10     Cancels/No Shows: 0/0    Subjective:    Pain:  [x] Yes  [] No Location:  N/A   Pain Rating: (0-10 scale) 0/10  Pain altered Tx:  [] No  [] Yes  Action:  Comments: Pt arrives with no pain, states to having no soreness after last visit. Agreeable to have today be last visit.      Objective:  Exercises:  Exercise  L sided cervical radiculopathy     Reps/ Time Weight/ Level Comments   UBE 5'    not today              *UT stretch 3x30\"       *Levator stretch 3x30\"       *Seated scapular retraction 2x10                 Theraband midrow, shoulder ext, horizontal abd 2x10    hold              Standing active shoulder flex and horiztonal abd 2x10    cues to decrease ROM in order to avoid UT compensation with good follow through- not today                                                              Other: Manual:  DI L levator and UT, hypervolt to upper thoracic paraspinals and L UT, levator with light pressure - no hypervolt today d/t pt stating too painful      Integrative Dry Needling: L UT, C6/C7 paravertebrals. Homeostatic point: Spinal Accessory     Specific Instructions for next treatment: Place chart on hold        Treatment Charges: Mins Units   []  Modalities     [x]  Ther Exercise 5 0   [x]  Manual Therapy 25 2   []  Ther Activities     []  Aquatics     []  Vasocompression     [x]  Other: Dry Needling 5 0   Total Treatment time 35 2       Assessment: [x] Progressing toward goals. Pt arriving with no pain, agreeable to be discharged this date. Placed chart on hold d/t pt request in case pain returns within a month. Pt agreeable to plan        [] No change. [] Other:  [x] Patient would continue to benefit from skilled physical therapy services in order to: Decrease UT and levator tightness in order to decrease cervical pain.            Goals:                               STG: (to be met in 10 treatments)  1. ? Pain: Decrease pain levels to 4/10 at worst  2. ? ROM: Increase flexibility and AROM limitations throughout to equal bilat to reduce difficulty with ADLs, increase cervical extension to only 25% impaired and without pain. 3. ? Strength: Increase L shoulder abd and flex strength to 5/5  4. ? Function: Pt to report increased ability to cook without pain  5. Independent with Home Exercise Programs  6. Demonstrate Knowledge of fall prevention     LTG: (to be met in 20 treatments)  1. Improve score on assessment tool from 27.5% impaired to 10% impaired, demonstrating an improvement in ADLs  2. Reduce pain levels to 0/10    Pt. Education:  [x] Yes  [] No  [] Reviewed Prior HEP/Ed  Method of Education: [x] Verbal  [x] Demo  [x] Written  Comprehension of Education:  [x] Verbalizes understanding. [x] Demonstrates understanding. [x] Needs review. [] Demonstrates/verbalizes HEP/Ed previously given. Plan: [x] Continue current frequency toward long and short term goals.           Time In: 1100            Time Out:  1339    Electronically signed by:  Nery Castellanos PT

## 2021-03-04 DIAGNOSIS — M50.30 DDD (DEGENERATIVE DISC DISEASE), CERVICAL: ICD-10-CM

## 2021-03-04 RX ORDER — TIZANIDINE 2 MG/1
TABLET ORAL
Qty: 30 TABLET | Refills: 0 | Status: SHIPPED | OUTPATIENT
Start: 2021-03-04 | End: 2021-03-08 | Stop reason: SDUPTHER

## 2021-03-04 RX ORDER — ALUMINUM ZIRCONIUM TRICHLOROHYDREX GLY 0.19 G/G
STICK TOPICAL
Qty: 83 TABLET | Refills: 3 | Status: SHIPPED | OUTPATIENT
Start: 2021-03-04 | End: 2021-03-08 | Stop reason: SDUPTHER

## 2021-03-08 DIAGNOSIS — M50.30 DDD (DEGENERATIVE DISC DISEASE), CERVICAL: ICD-10-CM

## 2021-03-08 RX ORDER — OMEPRAZOLE 20 MG/1
20 TABLET, DELAYED RELEASE ORAL DAILY
Qty: 30 TABLET | Refills: 5 | Status: SHIPPED | OUTPATIENT
Start: 2021-03-08 | End: 2021-04-07

## 2021-03-08 RX ORDER — TIZANIDINE 2 MG/1
2 TABLET ORAL EVERY EVENING
Qty: 30 TABLET | Refills: 3 | Status: SHIPPED | OUTPATIENT
Start: 2021-03-08 | End: 2021-04-07

## 2021-03-16 ENCOUNTER — OFFICE VISIT (OUTPATIENT)
Dept: PRIMARY CARE CLINIC | Age: 66
End: 2021-03-16
Payer: MEDICARE

## 2021-03-16 VITALS
TEMPERATURE: 96.8 F | HEIGHT: 62 IN | HEART RATE: 80 BPM | OXYGEN SATURATION: 97 % | SYSTOLIC BLOOD PRESSURE: 138 MMHG | WEIGHT: 219.6 LBS | BODY MASS INDEX: 40.41 KG/M2 | DIASTOLIC BLOOD PRESSURE: 82 MMHG

## 2021-03-16 DIAGNOSIS — M50.30 DDD (DEGENERATIVE DISC DISEASE), CERVICAL: Primary | Chronic | ICD-10-CM

## 2021-03-16 PROCEDURE — 1036F TOBACCO NON-USER: CPT | Performed by: NURSE PRACTITIONER

## 2021-03-16 PROCEDURE — 3017F COLORECTAL CA SCREEN DOC REV: CPT | Performed by: NURSE PRACTITIONER

## 2021-03-16 PROCEDURE — 99214 OFFICE O/P EST MOD 30 MIN: CPT | Performed by: NURSE PRACTITIONER

## 2021-03-16 PROCEDURE — 4040F PNEUMOC VAC/ADMIN/RCVD: CPT | Performed by: NURSE PRACTITIONER

## 2021-03-16 PROCEDURE — G8484 FLU IMMUNIZE NO ADMIN: HCPCS | Performed by: NURSE PRACTITIONER

## 2021-03-16 PROCEDURE — 1123F ACP DISCUSS/DSCN MKR DOCD: CPT | Performed by: NURSE PRACTITIONER

## 2021-03-16 PROCEDURE — G8417 CALC BMI ABV UP PARAM F/U: HCPCS | Performed by: NURSE PRACTITIONER

## 2021-03-16 PROCEDURE — 1090F PRES/ABSN URINE INCON ASSESS: CPT | Performed by: NURSE PRACTITIONER

## 2021-03-16 PROCEDURE — G8427 DOCREV CUR MEDS BY ELIG CLIN: HCPCS | Performed by: NURSE PRACTITIONER

## 2021-03-16 PROCEDURE — G8400 PT W/DXA NO RESULTS DOC: HCPCS | Performed by: NURSE PRACTITIONER

## 2021-03-16 RX ORDER — HYDROCODONE BITARTRATE AND ACETAMINOPHEN 5; 325 MG/1; MG/1
1 TABLET ORAL EVERY 6 HOURS PRN
Qty: 15 TABLET | Refills: 0 | Status: SHIPPED | OUTPATIENT
Start: 2021-03-16 | End: 2021-03-21

## 2021-03-16 ASSESSMENT — ENCOUNTER SYMPTOMS
RESPIRATORY NEGATIVE: 1
ALLERGIC/IMMUNOLOGIC NEGATIVE: 1
EYES NEGATIVE: 1
GASTROINTESTINAL NEGATIVE: 1

## 2021-03-16 ASSESSMENT — PATIENT HEALTH QUESTIONNAIRE - PHQ9
2. FEELING DOWN, DEPRESSED OR HOPELESS: 0
SUM OF ALL RESPONSES TO PHQ9 QUESTIONS 1 & 2: 0
1. LITTLE INTEREST OR PLEASURE IN DOING THINGS: 0

## 2021-03-16 NOTE — PROGRESS NOTES
Lars Henson [address] [place] [address]  Dept: [phone]       Bee Zavala is a 78 y.o. female who presents to the office today for evaluation of Neck Pain (pt helped for about 3-4 weeks, pain coming back)    Pt here with concerns on neck pain. This has been an ongoing issue for her. She had an MRI done in Dec which showed cervical DDD and she attended PT for 6 weeks which helped a lot. She would like to return to PT as she is noticing her neck is starting to bother her again. She notices it is worse on the left side and feels tight muscles as well. She takes norco as needed when pain gets too intense. Will start back in PT, pt also requested referral to Dr. Marcos Botello ( has seen him) as well. Patient Active Problem List    Diagnosis Date Noted    Mild intermittent asthma without complication 19/27/5570    Non-seasonal allergic rhinitis due to pollen 12/10/2020    DDD (degenerative disc disease), cervical 12/10/2020     Patient's BMI is Body mass index is 40.17 kg/m². Social History     Tobacco Use    Smoking status: Never Smoker    Smokeless tobacco: Never Used   Substance Use Topics    Alcohol use: Not Currently    Drug use: Never      History reviewed. No pertinent past medical history. PHQ-9 Total Score: 0 (3/16/2021 10:11 AM)     Immunization:  Immunization History   Administered Date(s) Administered    COVID-19, Moderna, PF, 100mcg/0.5mL 02/17/2021    Influenza, Quadv, adjuvanted, 65 yrs +, IM, PF (Fluad) 10/16/2020    Pneumococcal Conjugate 13-valent ([id number]) 01/11/2021    Zoster Recombinant (Shingrix) 10/14/2020, 12/14/2020     History reviewed. No pertinent surgical history. History reviewed. No pertinent family history.   Current Outpatient Medications   Medication Sig Dispense Refill    HYDROcodone-acetaminophen (NORCO) 5-325 MG per tablet Take 1 tablet by mouth every 6 hours as needed for Pain (neck pain) for up to 5 °F (36 °C) (Temporal)   Ht 5' 2\" (1.575 m)   Wt 219 lb 9.6 oz (99.6 kg)   SpO2 97%   BMI 40.17 kg/m²   Physical Exam  Vitals signs and nursing note reviewed. Constitutional:       Appearance: Normal appearance. HENT:      Right Ear: Tympanic membrane, ear canal and external ear normal.      Left Ear: Tympanic membrane, ear canal and external ear normal.      Nose: Nose normal.      Mouth/Throat:      Mouth: Mucous membranes are moist.      Pharynx: No oropharyngeal exudate. Eyes:      Extraocular Movements: Extraocular movements intact. Conjunctiva/sclera: Conjunctivae normal.      Pupils: Pupils are equal, round, and reactive to light. Neck:      Musculoskeletal: Decreased range of motion. Pain with movement and muscular tenderness present. Cardiovascular:      Rate and Rhythm: Normal rate and regular rhythm. Pulses: Normal pulses. Heart sounds: Normal heart sounds. Pulmonary:      Effort: Pulmonary effort is normal. No respiratory distress. Breath sounds: Normal breath sounds. No stridor. No wheezing. Abdominal:      General: Abdomen is flat. Bowel sounds are normal.      Palpations: Abdomen is soft. Tenderness: There is no abdominal tenderness. Skin:     General: Skin is warm and dry. Capillary Refill: Capillary refill takes less than 2 seconds. Neurological:      General: No focal deficit present. Mental Status: She is alert. Psychiatric:         Mood and Affect: Mood normal.         Behavior: Behavior normal.         Thought Content:  Thought content normal.       No results found for: WBC, HGB, HCT, MCV, PLT  No results found for: NA, K, CL, CO2, BUN, CREATININE, GLUCOSE, CALCIUM   No results found for: ALT, AST, GGT, ALKPHOS, BILITOT  No results found for: TSHFT4, TSH  No results found for: CHOL  No results found for: TRIG  No results found for: HDL  No results found for: LDLCALC, LDLCHOLESTEROL  No results found for: CHOLHDLRATIO  No results found Moderna 2-dose series) 03/17/2021      Medications Discontinued During This Encounter   Medication Reason    HYDROcodone-acetaminophen ([address]) 5-325 MG per tablet REORDER     The patient's past medical, surgical, social, and family history as well as her   current medications and allergies were reviewed as documented in today's encounter. Medications, labs, diagnostic studies, consultations and follow-up as documented in this encounter. Return if symptoms worsen or fail to improve. No future appointments. This note was completed by using the assistance of a speech-recognition program. However, inadvertent computerized transcription errors may be present. Although every effort was made to ensure accuracy, no guarantees can be provided that every mistake has been identified and corrected by editing.   Electronically signed by BRIAN Evans CNP on 3/16/2021 at 12:45 PM

## 2021-03-23 ENCOUNTER — HOSPITAL ENCOUNTER (OUTPATIENT)
Dept: PHYSICAL THERAPY | Facility: CLINIC | Age: 66
Setting detail: THERAPIES SERIES
Discharge: HOME | End: 2021-03-23
Payer: MEDICARE

## 2021-03-23 PROCEDURE — 97140 MANUAL THERAPY 1/> REGIONS: CPT

## 2021-03-23 PROCEDURE — 97161 PT EVAL LOW COMPLEX 20 MIN: CPT

## 2021-03-23 NOTE — CONSULTS
[] [address]  [address]  [phone]  F: [phone] [] [address]  [place] [address]   [address]  P: [phone]  F: [phone] [] [place]  [address] [facility]  P: [phone]  F: [phone] [] [address]  [facility]   [address]   [place]: [phone]  F: [phone]  [x] [address]  P: [phone]  F: [phone]      Physical Therapy Spine Evaluation    Date:  3/23/2021  Patient: Lady Dunlap  :  1955  MRN: [id number]  Physician: Tanner Mauricio CNP  Insurance: Medicare  Medical Diagnosis: DDD Cervical  Rehab Codes: M50.30  Onset date: 20  Next Dr's appt.: TBA    Subjective:   CC: Pt arrives after recently being DC from PT (2021) d/t neck pain being resolved. Pt states that pain returned approx one month after ending PT. Describes pain as L sided neck pain that radiated into shoulder (previously was radiating into elbow); intermittent. Currently arrives with minimal pain, but states when the pain does occur it is significant, and requires pt to rest until pain subsides. Pt also has a referall to Dr. Renee Silverio for a possible surgical consult. PMHx: [] Unremarkable [] Diabetes [] HTN  [] Pacemaker   [] MI/Heart Problems [] Cancer [] Arthritis [] Other:              [x] Refer to full medical chart  In EPIC         Tests: [] X-Ray: [] MRI:  [] Other:    Medications: [x] Refer to full medical record [] None [] Other:  Allergies:      [x] Refer to full medical record [] None [] Other:          Pain present?  Yes   Location L sided UT   Pain Rating currently 2/10   Pain at worse 10/10   Pain at best 2/10   Description of pain    Altered Sensation Numbness in posterior neck Score: 38% functionally impaired         Assessment:  Patient would benefit from skilled physical therapy services in order to: Decrease LUT tightness and correct cervical somatic dysfuncitons, as well as strengthen deep neck flexors and scapular retractors to provide cervical stabilization and longer lasting improvement of symptoms. Goals:        STG: (to be met in 10 treatments)  1. ? Pain: Decrease pain levels to 5/10 at worst in L sided neck   2. ? ROM: Increase flexibility and AROM limitations throughout to equal bilat to reduce difficulty with ADLs, full painfree cervical AROM  3. ? Strength: Increase scapular retractor strength to 4+/5  4. ? Function: Pt to report being able to vacuum without an increase in pain   5. Independent with Home Exercise Programs    LTG: (to be met in 20 treatments)  1. Improve score on assessment tool from 38% impaired to 15% impaired, demonstrating an improvement in ADLs  2. Reduce pain levels to 0/10                   Patient goals: To have longer lasting improvement of pain compared to last PT visits    Rehab Potential:  [x] Good  [] Fair  [] Poor   Suggested Professional Referral:  [x] No  [] Yes:  Barriers to Goal Achievement[de-identified]  [x] No  [] Yes:  Domestic Concerns:  [x] No  [] Yes:    Pt. Education:  [x] Plans/Goals, Risks/Benefits discussed  [x] Home exercise program    Method of Education: [x] Verbal  [x] Demo  [x] Written  Comprehension of Education:  [x] Verbalizes understanding. [x] Demonstrates understanding. [x] Needs Review. [] Demonstrates/verbalizes understanding of HEP/Ed previously given.     Treatment Plan:  [x] Therapeutic Exercise    [] Aquatic Therapy   [x] Manual Therapy     [x] Electrical Stimulation  [x] Instruction in HEP      [] Lumbar/Cervical Traction  [x] Neuromuscular Re-education [x] Cold/hotpack  [] Iontophoresis: 4 mg/mL  [] Vasocompression (GameReady)                    Dexamethasone Sodium  [] Gait Training             Phosphate 40-80 Marciin  [x] Integrative Dry Needling         []  Medication allergies reviewed for use of    Dexamethasone Sodium Phosphate 4mg/ml     with iontophoresis treatments. Pt is not allergic. Frequency:  2 x/week for 20 visits    Todays Treatment:    Exercises:  Exercise   L sided cervical pain    Reps/ Time Weight/ Level Comments         UBE            *Deep neck flexor 2x10     Seated UT stretch      Levator stretch            Prone scapular retraction and depression                                                Other:Manual: Level 2 MFR to L sided UT, Level 3 levator, rhomboids and UT    Integrative Dry Needling: L sided cervical paravertebrals C2-C5, L UT muscle belly with muscle twitch noted in mid muscle     Specific Instructions for next treatment: Continue with manual, progress to scapular stabilizers as well     Evaluation Complexity:  History (Personal factors, comorbidities) [x] 0 [] 1-2 [] 3+   Exam (limitations, restrictions) [x] 1-2 [] 3 [] 4+   Clinical presentation (progression) [x] Stable [] Evolving  [] Unstable   Decision Making [x] Low [] Moderate [] High    [x] Low Complexity [] Moderate Complexity [] High Complexity       Treatment Charges: Mins Units   [x] Evaluation       [x]  Low       []  Moderate       []  High 15 1   []  Modalities     [x]  Ther Exercise 5 0   [x]  Manual Therapy 25 2   []  Ther Activities     []  Aquatics     []  Vasocompression     []  Other       TOTAL TREATMENT TIME: 45 minutes    Time in: 1105   Time Out: 1150    Electronically signed by: Armand Troy PT        Physician Signature:________________________________Date:__________________  By signing above or cosigning this note, I have reviewed this plan of care and certify a need for medically necessary rehabilitation services.      *PLEASE SIGN ABOVE AND FAX BACK ALL PAGES*

## 2021-03-23 NOTE — PRE-CERTIFICATION NOTE
[facility] Fall Risk Assessment    Patient Name:  Janie Mendoza  : 1955        Risk Factor Scale  Score   History of Falls [] Yes  [x] No 25  0 0   Secondary Diagnosis [] Yes  [x] No 15  0 0   Ambulatory Aid [] Furniture  [] Crutches/cane/walker  [x] None/bedrest/wheelchair/nurse 30  15  0 0   IV/Heparin Lock [] Yes  [x] No 20  0 0   Gait/Transferring [] Impaired  [] Weak  [x] Normal/bedrest/immobile 20  10  0 0   Mental Status [] Forgets limitations  [x] Oriented to own ability 15  0 0      Total: 0     Based on the Assessment score: check the appropriate box.     [x]  No intervention needed   Low =   Score of 0-24    []  Use standard prevention interventions Moderate =  Score of 24-44   [] Give patient handout and discuss fall prevention strategies   [] Establish goal of education for patient/family RE: fall prevention strategies    []  Use high risk prevention interventions High = Score of 45 and higher   [] Give patient handout and discuss fall prevention strategies   [] Establish goal of education for patient/family Re: fall prevention strategies   [] Discuss lifeline / other resources    Electronically signed by:   Jenn Calderón PT  Date: 3/23/2021

## 2021-03-23 NOTE — PRE-CERTIFICATION NOTE
Medicare Cap   [] Physical Therapy  [] Speech Therapy  [] Occupational therapy  *PT and Speech caps combine      $2100 Limit for PT and Speech combined  $2100 Limit for OT individually  At the beginning of the month where you expect to go over $2100, please add the [address] [place] [address] modifier      Patient Name: Clementina Blizzard  YOB: 1955    Note:  This is an estimate of charges billed.      Date of [address] Name # units/ charge $$$ charge Daily Total Charge Ongoing Total $$$       Previous therapy this calender year 245.62   3/23 PT eval  Manual 1+2 82.82+21.70+21.70 126.22 371.84

## 2021-03-31 ENCOUNTER — HOSPITAL ENCOUNTER (OUTPATIENT)
Dept: PHYSICAL THERAPY | Facility: CLINIC | Age: 66
Setting detail: THERAPIES SERIES
Discharge: HOME | End: 2021-03-31
Payer: MEDICARE

## 2021-03-31 NOTE — PRE-CERTIFICATION NOTE
Medicare Cap   [] Physical Therapy  [] Speech Therapy  [] Occupational therapy  *PT and Speech caps combine      $2100 Limit for PT and Speech combined  $2100 Limit for OT individually  At the beginning of the month where you expect to go over $2100, please add the [address] [place] [address] modifier      Patient Name: Tono Reyes  YOB: 1955    Note:  This is an estimate of charges billed.      Date of [address] Name # units/ charge $$$ charge Daily Total Charge Ongoing Total $$$       Previous therapy this calender year 245.62   3/23 PT eval  Manual 1+2 82.82+21.70+21.70 126.22 371.84   3/31 Manual  Therex 1+1 29.72+21.70 51.42 423.26

## 2021-04-06 ENCOUNTER — APPOINTMENT (OUTPATIENT)
Dept: PHYSICAL THERAPY | Facility: CLINIC | Age: 66
End: 2021-04-06
Payer: MEDICARE

## 2021-04-08 ENCOUNTER — APPOINTMENT (OUTPATIENT)
Dept: PHYSICAL THERAPY | Facility: CLINIC | Age: 66
End: 2021-04-08
Payer: MEDICARE

## 2021-04-12 RX ORDER — MONTELUKAST SODIUM 10 MG/1
TABLET ORAL
Qty: 90 TABLET | Refills: 3 | Status: SHIPPED | OUTPATIENT
Start: 2021-04-12

## 2021-04-21 ENCOUNTER — HOSPITAL ENCOUNTER (OUTPATIENT)
Dept: PHYSICAL THERAPY | Facility: CLINIC | Age: 66
Setting detail: THERAPIES SERIES
Discharge: HOME | End: 2021-04-21
Payer: MEDICARE

## 2021-04-21 PROCEDURE — 97140 MANUAL THERAPY 1/> REGIONS: CPT

## 2021-04-21 NOTE — FLOWSHEET NOTE
[] [facility]) [facility] &  Therapy  [address]  [phone]  F: [phone] [] [address]  [place] [address]   [address]  P: [phone]  F: [phone] [] [address]  Therapy  [address] [facility] [address]  P: [phone]  F: [phone] [x] [address]  P: [phone]  F: [phone] [] [address]  [facility]   [address]   [place]: [phone]  F: [phone]      Physical Therapy Daily Treatment Note    Date:  2021  Patient Name:  Judie Vázquez    :  1955  MRN: [id number]  Physician: Jennifer Segundo CNP              Insurance: Medicare  Medical Diagnosis: DDD Cervical                Rehab Codes: M50.30  Onset date: 20                 Next 's appt.: TBA  Visit# / total visits: 3/20;  Progress note for Medicare patient due at visit 10     Cancels/No Shows: 0/0    Subjective:    Pain:  [] Yes  [] No Location: L sided neck pain  Pain Rating: (0-10 scale) 2/10  Pain altered Tx:  [] No  [] Yes  Action:  Comments: Pt arrives stating to having an increase in soreness, which pt attirbutes to not being able to come for 3 weeks secondary to sinus/allergy issues    Objective:  Exercises:  Exercise   L sided cervical pain     Reps/ Time Weight/ Level Comments             UBE: fwd/bkwd  2'/2'                 *Deep neck flexor 2x10       Seated UT stretch  3x30\"       Levator stretch  3x30\"                 Seated scapular retraction and depression  2x10    next                                                                         Other:Manual: Level 2 MFR to L sided UT, Level 3 levator, rhomboids and UT     Integrative Dry Needling: L sided cervical paravertebrals C2-C5, L UT muscle belly with muscle twitch noted in mid muscle    Specific Instructions for next treatment: Continue with manual, progress to scapular stabilizers as well               Treatment Charges: Mins Units   []  Modalities     [x]  Ther Exercise 5 0   [x]  Manual Therapy 25 2   []  Ther Activities     []  Aquatics     []  Vasocompression     [x]  Other: Dry Needling 5 0   Total Treatment time 35 2       Assessment: [x] Progressing toward goals. Focused on manual this date d/t pt not being able to come to therapy for 3 weeks d/t personal illness. Pt noting decreased tightness post manual.     [] No change. [] Other:  [] Patient would continue to benefit from skilled physical therapy services in order to: Decrease LUT tightness and correct cervical somatic dysfuncitons, as well as strengthen deep neck flexors and scapular retractors to provide cervical stabilization and longer lasting improvement of symptoms. STG: (to be met in 10 treatments)  1. ? Pain: Decrease pain levels to 5/10 at worst in L sided neck   2. ? ROM: Increase flexibility and AROM limitations throughout to equal bilat to reduce difficulty with ADLs, full painfree cervical AROM  3. ? Strength: Increase scapular retractor strength to 4+/5  4. ? Function: Pt to report being able to vacuum without an increase in pain   5. Independent with Home Exercise Programs     LTG: (to be met in 20 treatments)  1. Improve score on assessment tool from 38% impaired to 15% impaired, demonstrating an improvement in ADLs  2. Reduce pain levels to 0/10                    Patient goals: To have longer lasting improvement of pain compared to last PT visits    Pt. Education:  [x] Yes  [] No  [] Reviewed Prior HEP/Ed  Method of Education: [x] Verbal  [x] Demo  [] Written  Comprehension of Education:  [x] Verbalizes understanding. [x] Demonstrates understanding. [] Needs review. [] Demonstrates/verbalizes HEP/Ed previously given. Plan: [x] Continue current frequency toward long and short term goals. Time In: 1330            Time Out: 1403    Electronically signed by:  Donna Harris PT

## 2021-04-21 NOTE — PRE-CERTIFICATION NOTE
Medicare Cap   [] Physical Therapy  [] Speech Therapy  [] Occupational therapy  *PT and Speech caps combine      $2100 Limit for PT and Speech combined  $2100 Limit for OT individually  At the beginning of the month where you expect to go over $2100, please add the [address] [place] [address] modifier      Patient Name: Kush Rice  YOB: 1955    Note:  This is an estimate of charges billed.      Date of [address] Name # units/ charge $$$ charge Daily Total Charge Ongoing Total $$$       Previous therapy this calender year 245.62   3/23 PT eval  Manual 1+2 82.82+21.70+21.70 126.22 371.84   3/31 Manual  Therex 1+1 29.72+21.70 51.42 423.26   4/21 Manual 2 27.29+21.70 48.99 472.25

## 2021-04-28 ENCOUNTER — HOSPITAL ENCOUNTER (OUTPATIENT)
Dept: PHYSICAL THERAPY | Facility: CLINIC | Age: 66
Setting detail: THERAPIES SERIES
Discharge: HOME | End: 2021-04-28
Payer: MEDICARE

## 2021-04-30 ENCOUNTER — HOSPITAL ENCOUNTER (OUTPATIENT)
Dept: PHYSICAL THERAPY | Facility: CLINIC | Age: 66
Setting detail: THERAPIES SERIES
Discharge: HOME | End: 2021-04-30
Payer: MEDICARE

## 2021-04-30 PROCEDURE — 97140 MANUAL THERAPY 1/> REGIONS: CPT

## 2021-04-30 NOTE — FLOWSHEET NOTE
[] [facility]) [facility] &  Therapy  [address]  [phone]  F: [phone] [] [address]  [employer]   [address]  P: [phone]  F: [phone] [] [address] &  Therapy  [address]  P: [phone]  F: [phone] [x] [address]  P: [phone]  F: [phone] [] [address]  [facility]   [address]   [place]: [phone]  F: [phone]      Physical Therapy Daily Treatment Note    Date:  2021  Patient Name:  Ben Boudreaux    :  1955  MRN: [id number]  Physician: Kimberly Fernández CNP              Insurance: Medicare  Medical Diagnosis: DDD Cervical                Rehab Codes: M50.30  Onset date: 20                 Next 's appt.: TBA  Visit# / total visits: ;  Progress note for Medicare patient due at visit 10     Cancels/No Shows: 0/0    Subjective:    Pain:  [] Yes  [] No Location: L sided neck pain  Pain Rating: (0-10 scale) 2/10  Pain altered Tx:  [] No  [] Yes  Action:  Comments: Pt arrives stating that pain is improving, however continues to have instances of \"sharp\" pain. States that recently saw doctor today who is recommending patient pursue injections.      Objective:  Exercises:  Exercise   L sided cervical pain     Reps/ Time Weight/ Level Comments             UBE: fwd/bkwd  2'/2'                 *Deep neck flexor 2x10       Seated UT stretch  3x30\"       Levator stretch  3x30\"                 Seated scapular retraction and depression  2x10                                                                             Other:Manual: Level 2 MFR to L sided UT, Level 3 levator, rhomboids and UT     Integrative Dry Needling: L sided cervical paravertebrals C2-C5, L UT muscle belly with muscle twitch noted in mid muscle      Specific Instructions for next treatment: Continue with manual, progress to scapular stabilizers as well               Treatment Charges: Mins Units   []  Modalities     [x]  Ther Exercise 5 0   [x]  Manual Therapy 25 2   []  Ther Activities     []  Aquatics     []  Vasocompression     [x]  Other: Dry Needling 5 0   Total Treatment time 35 2       Assessment: [x] Progressing toward goals. Continued with manual, progressed to add in scapular retraction which pt tolerated well. [] No change. [] Other:  [] Patient would continue to benefit from skilled physical therapy services in order to: Decrease LUT tightness and correct cervical somatic dysfuncitons, as well as strengthen deep neck flexors and scapular retractors to provide cervical stabilization and longer lasting improvement of symptoms. STG: (to be met in 10 treatments)  1. ? Pain: Decrease pain levels to 5/10 at worst in L sided neck   2. ? ROM: Increase flexibility and AROM limitations throughout to equal bilat to reduce difficulty with ADLs, full painfree cervical AROM  3. ? Strength: Increase scapular retractor strength to 4+/5  4. ? Function: Pt to report being able to vacuum without an increase in pain   5. Independent with Home Exercise Programs     LTG: (to be met in 20 treatments)  1. Improve score on assessment tool from 38% impaired to 15% impaired, demonstrating an improvement in ADLs  2. Reduce pain levels to 0/10                    Patient goals: To have longer lasting improvement of pain compared to last PT visits    Pt. Education:  [x] Yes  [] No  [] Reviewed Prior HEP/Ed  Method of Education: [x] Verbal  [x] Demo  [] Written  Comprehension of Education:  [x] Verbalizes understanding. [x] Demonstrates understanding. [] Needs review. [] Demonstrates/verbalizes HEP/Ed previously given. Plan: [x] Continue current frequency toward long and short term goals.           Time In: 1100            Time Out: 1130    Electronically signed by:  Amanda Gardner, PT

## 2021-04-30 NOTE — PRE-CERTIFICATION NOTE
Medicare Cap   [] Physical Therapy  [] Speech Therapy  [] Occupational therapy  *PT and Speech caps combine      $2100 Limit for PT and Speech combined  $2100 Limit for OT individually  At the beginning of the month where you expect to go over $2100, please add the [address] [place] [address] modifier      Patient Name: Samuel Monsivais  YOB: 1955    Note:  This is an estimate of charges billed.      Date of [address] Name # units/ charge $$$ charge Daily Total Charge Ongoing Total $$$       Previous therapy this calender year 245.62   3/23 PT eval  Manual 1+2 82.82+21.70+21.70 126.22 371.84   3/31 Manual  Therex 1+1 29.72+21.70 51.42 423.26   4/21 Manual 2 27.29+21.70 48.99 472.25   4/30 Manual 2 27.29+21.70 48.99 524.24

## 2021-05-03 ENCOUNTER — HOSPITAL ENCOUNTER (OUTPATIENT)
Dept: PHYSICAL THERAPY | Facility: CLINIC | Age: 66
Setting detail: THERAPIES SERIES
Discharge: HOME | End: 2021-05-03
Payer: MEDICARE

## 2021-05-03 PROCEDURE — 97140 MANUAL THERAPY 1/> REGIONS: CPT

## 2021-05-03 NOTE — FLOWSHEET NOTE
[] [facility]) [facility] &  Therapy  [address]  [phone]  F: [phone] [] [address]  [employer] 36   [address]  P: [phone]  F: [phone] [] [address]  Therapy  [address]  P: [phone]  F: [phone] [x] [address]  P: [phone]  F: [phone] [] [address]  [facility]   [address]   [place]: [phone]  F: [phone]      Physical Therapy Daily Treatment Note    Date:  5/3/2021  Patient Name:  Emile Mcgraw    :  1955  MRN: [id number]  Physician: Taylor Kwong CNP              Insurance: Medicare  Medical Diagnosis: DDD Cervical                Rehab Codes: M50.30  Onset date: 20                 Next 's appt.: TBA  Visit# / total visits: ;  Progress note for Medicare patient due at visit 10     Cancels/No Shows: 0/0    Subjective:    Pain:  [] Yes  [] No Location: L sided neck pain  Pain Rating: (0-10 scale) 2/10  Pain altered Tx:  [] No  [] Yes  Action:  Comments: Pt arrives noting increased pain after last visit that lasted for a few days, pain has decreased but continue to be more sore than normal.     Objective:  Exercises:  Exercise   L sided cervical pain     Reps/ Time Weight/ Level Comments             UBE: fwd/bkwd  2'/2'                 *Deep neck flexor 2x10       Seated UT stretch  3x30\"       Levator stretch  3x30\"                 Seated scapular retraction and depression  2x10                                                                             Other:Manual: Level 1-2 MFR to L sided UT     Integrative Dry Needling: L sided cervical paravertebrals C2-C5, L UT muscle belly with muscle twitch noted in mid muscle      Specific Instructions for next treatment: Continue with manual, progress to scapular stabilizers as well               Treatment Charges: Mins Units   []  Modalities     [x]  Ther Exercise 5 0   [x]  Manual Therapy 25 2   []  Ther Activities     []  Aquatics     []  Vasocompression     [x]  Other: Dry Needling 5 0   Total Treatment time 35 2       Assessment: [x] Progressing toward goals. Due to patient stating to having increased pain after last visit, held exercises and focused on manual with gentle MFR for pain relief. Pt with decreased pain noted post     [] No change. [] Other:  [] Patient would continue to benefit from skilled physical therapy services in order to: Decrease LUT tightness and correct cervical somatic dysfuncitons, as well as strengthen deep neck flexors and scapular retractors to provide cervical stabilization and longer lasting improvement of symptoms. STG: (to be met in 10 treatments)  1. ? Pain: Decrease pain levels to 5/10 at worst in L sided neck   2. ? ROM: Increase flexibility and AROM limitations throughout to equal bilat to reduce difficulty with ADLs, full painfree cervical AROM  3. ? Strength: Increase scapular retractor strength to 4+/5  4. ? Function: Pt to report being able to vacuum without an increase in pain   5. Independent with Home Exercise Programs     LTG: (to be met in 20 treatments)  1. Improve score on assessment tool from 38% impaired to 15% impaired, demonstrating an improvement in ADLs  2. Reduce pain levels to 0/10                    Patient goals: To have longer lasting improvement of pain compared to last PT visits    Pt. Education:  [x] Yes  [] No  [] Reviewed Prior HEP/Ed  Method of Education: [x] Verbal  [x] Demo  [] Written  Comprehension of Education:  [x] Verbalizes understanding. [x] Demonstrates understanding. [] Needs review. [] Demonstrates/verbalizes HEP/Ed previously given. Plan: [x] Continue current frequency toward long and short term goals.           Time In: 1000            Time Out: 1030    Electronically signed by:  Gaby Gurrola, PT

## 2021-05-03 NOTE — PRE-CERTIFICATION NOTE
Medicare Cap   [] Physical Therapy  [] Speech Therapy  [] Occupational therapy  *PT and Speech caps combine      $2100 Limit for PT and Speech combined  $2100 Limit for OT individually  At the beginning of the month where you expect to go over $2100, please add the [address] [place] modifier      Patient Name: Sharad Ordoñez  YOB: 1955    Note:  This is an estimate of charges billed.      Date of [address] Name # units/ charge $$$ charge Daily Total Charge Ongoing Total $$$       Previous therapy this calender year 245.62   3/23 PT eval  Manual 1+2 82.82+21.70+21.70 126.22 371.84   3/31 Manual  Therex 1+1 29.72+21.70 51.42 423.26   4/21 Manual 2 27.29+21.70 48.99 472.25   4/30 Manual 2 27.29+21.70 48.99 524.24   5/3 Manual 2 27.29+21.70 48.99 573.23

## 2021-05-05 ENCOUNTER — HOSPITAL ENCOUNTER (OUTPATIENT)
Dept: PHYSICAL THERAPY | Facility: CLINIC | Age: 66
Setting detail: THERAPIES SERIES
Discharge: HOME | End: 2021-05-05
Payer: MEDICARE

## 2021-05-05 PROCEDURE — 97140 MANUAL THERAPY 1/> REGIONS: CPT

## 2021-05-05 NOTE — PRE-CERTIFICATION NOTE
Medicare Cap   [] Physical Therapy  [] Speech Therapy  [] Occupational therapy  *PT and Speech caps combine      $2100 Limit for PT and Speech combined  $2100 Limit for OT individually  At the beginning of the month where you expect to go over $2100, please add the [address] [place] modifier      Patient Name: Joaquim Noble  YOB: 1955    Note:  This is an estimate of charges billed.      Date of [address] Name # units/ charge $$$ charge Daily Total Charge Ongoing Total $$$       Previous therapy this calender year 245.62   3/23 PT eval  Manual 1+2 82.82+21.70+21.70 126.22 371.84   3/31 Manual  Therex 1+1 29.72+21.70 51.42 423.26   4/21 Manual 2 27.29+21.70 48.99 472.25   4/30 Manual 2 27.29+21.70 48.99 524.24   5/3 Manual 2 27.29+21.70 48.99 573.23   5/5 Manual 2 27.29+21.70 48.99 622.22

## 2021-05-05 NOTE — FLOWSHEET NOTE
[] [facility]) [facility] &  Therapy  [address]  [phone]  F: [phone] [] [address]  [facility]   [address]  P: [phone]  F: [phone] [] [address] &  Therapy  [address]  P: [phone]  F: [phone] [x] [address]  P: [phone]  F: [phone] [] [address]  [facility]   [address]   [place]: [phone]  F: [phone]      Physical Therapy Daily Treatment Note    Date:  2021  Patient Name:  Samuel Monsivais    :  1955  MRN: [id number]  Physician: Zaina Peter CNP              Insurance: Medicare  Medical Diagnosis: DDD Cervical                Rehab Codes: M50.30  Onset date: 20                 Next Dr's appt.: TBA  Visit# / total visits: ;  Progress note for Medicare patient due at visit 10     Cancels/No Shows: 0/0    Subjective:    Pain:  [] Yes  [] No Location: L sided neck pain  Pain Rating: (0-10 scale) 1/10  Pain altered Tx:  [] No  [] Yes  Action:  Comments: Pt arrives noting decreased pain overall, is no longer having \"sharpness\".      Objective:  Exercises:  Exercise   L sided cervical pain     Reps/ Time Weight/ Level Comments             UBE: fwd/bkwd  2'/2'                 *Deep neck flexor 2x10       Seated UT stretch  3x30\"       Levator stretch  3x30\"                 Seated scapular retraction and depression  2x10        Seated horizontal abd 2x10                                                                   Other:Manual: Level 1-2 MFR to L sided UT, levator      Integrative Dry Needling: L sided cervical paravertebrals C2-C5, L UT muscle belly with muscle twitch noted in mid muscle      Specific Instructions for next treatment: Continue with manual, progress to Out: 1135    Electronically signed by:  Emery Green PT

## 2021-05-10 ENCOUNTER — HOSPITAL ENCOUNTER (OUTPATIENT)
Dept: PHYSICAL THERAPY | Facility: CLINIC | Age: 66
Setting detail: THERAPIES SERIES
Discharge: HOME | End: 2021-05-10
Payer: MEDICARE

## 2021-05-10 PROCEDURE — 97140 MANUAL THERAPY 1/> REGIONS: CPT

## 2021-05-10 NOTE — FLOWSHEET NOTE
[] [facility]) [facility] &  Therapy  [address]  [phone]  F: [phone] [] [address]  [employer] 36   [address]  P: [phone]  F: [phone] [] [address] &  Therapy  [address]  P: [phone]  F: [phone] [x] [address]  P: [phone]  F: [phone] [] [address]  [facility]   [address]   [place]: [phone]  F: [phone]      Physical Therapy Daily Treatment Note    Date:  5/10/2021  Patient Name:  Elvia Coburn    :  1955  MRN: [id number]  Physician: Nikki Casiano CNP              Insurance: Medicare  Medical Diagnosis: DDD Cervical                Rehab Codes: M50.30  Onset date: 20                 Next 's appt.: TBA  Visit# / total visits: ;  Progress note for Medicare patient due at visit 10     Cancels/No Shows: 0/0    Subjective:    Pain:  [] Yes  [] No Location: L sided neck pain  Pain Rating: (0-10 scale) 1/10  Pain altered Tx:  [] No  [] Yes  Action:  Comments: Pt arrives states to having decreased pain overall. Did have increased pain last night and needed to take a pain pill.      Objective:  Exercises:  Exercise   L sided cervical pain     Reps/ Time Weight/ Level Comments             UBE: fwd/bkwd  2'/2'                 *Deep neck flexor 2x10       Seated UT stretch  3x30\"       Levator stretch  3x30\"                 Seated scapular retraction and depression  2x10        Seated horizontal abd 2x10                                                                   Other:Manual: Level 1-2 MFR to L sided UT, levator      HP in supine to cervical region 10' prior to manual     Integrative Dry Needling: L sided cervical paravertebrals C2-C5, L UT muscle belly with muscle twitch noted in mid muscle      Specific Instructions for next treatment: Continue with manual, progress to scapular stabilizers as well               Treatment Charges: Mins Units   [x]  Modalities: HP 10 NC   []  Ther Exercise     [x]  Manual Therapy 25 2   []  Ther Activities     []  Aquatics     []  Vasocompression     [x]  Other: Dry Needling 5 0   Total Treatment time 40 2       Assessment: [x] Progressing toward goals. Held exercises d/t patient arriving with increased pain last night. Utilized HP prior to manual in order to decrease tension; pt with noted decreased tightenss post manual. Will plan to progress strengthening next visit as long as patient arrives with decreased pain. [] No change. [] Other:  [] Patient would continue to benefit from skilled physical therapy services in order to: Decrease LUT tightness and correct cervical somatic dysfuncitons, as well as strengthen deep neck flexors and scapular retractors to provide cervical stabilization and longer lasting improvement of symptoms. STG: (to be met in 10 treatments)  1. ? Pain: Decrease pain levels to 5/10 at worst in L sided neck   2. ? ROM: Increase flexibility and AROM limitations throughout to equal bilat to reduce difficulty with ADLs, full painfree cervical AROM  3. ? Strength: Increase scapular retractor strength to 4+/5  4. ? Function: Pt to report being able to vacuum without an increase in pain   5. Independent with Home Exercise Programs     LTG: (to be met in 20 treatments)  1. Improve score on assessment tool from 38% impaired to 15% impaired, demonstrating an improvement in ADLs  2. Reduce pain levels to 0/10                    Patient goals: To have longer lasting improvement of pain compared to last PT visits    Pt. Education:  [x] Yes  [] No  [] Reviewed Prior HEP/Ed  Method of Education: [x] Verbal  [x] Demo  [] Written  Comprehension of Education:  [x] Verbalizes understanding. [x] Demonstrates understanding.   [] Needs review. [] Demonstrates/verbalizes HEP/Ed previously given. Plan: [x] Continue current frequency toward long and short term goals.           Time In: 0950            Time Out: 1030    Electronically signed by:  Rashaun Espinoza PT

## 2021-05-12 ENCOUNTER — HOSPITAL ENCOUNTER (OUTPATIENT)
Dept: PHYSICAL THERAPY | Facility: CLINIC | Age: 66
Setting detail: THERAPIES SERIES
Discharge: HOME | End: 2021-05-12
Payer: MEDICARE

## 2021-05-12 PROCEDURE — 97140 MANUAL THERAPY 1/> REGIONS: CPT

## 2021-05-12 NOTE — FLOWSHEET NOTE
L UT muscle belly with muscle twitch noted in mid muscle      Specific Instructions for next treatment: Continue with manual, progress to scapular stabilizers as well               Treatment Charges: Mins Units   [x]  Modalities: HP 10 NC   []  Ther Exercise     [x]  Manual Therapy 25 2   []  Ther Activities     []  Aquatics     []  Vasocompression     [x]  Other: Dry Needling 5 0   Total Treatment time 40 2       Assessment: [x] Progressing toward goals. Initiated treatment with UBE followed by HP for a warm up, pt noting decreased tension post. Continued with manual and exercises as noted above which pt tolerated well. Pt will return in two weeks due to going out of town next week. [] No change. [] Other:  [] Patient would continue to benefit from skilled physical therapy services in order to: Decrease LUT tightness and correct cervical somatic dysfuncitons, as well as strengthen deep neck flexors and scapular retractors to provide cervical stabilization and longer lasting improvement of symptoms. STG: (to be met in 10 treatments)  1. ? Pain: Decrease pain levels to 5/10 at worst in L sided neck   2. ? ROM: Increase flexibility and AROM limitations throughout to equal bilat to reduce difficulty with ADLs, full painfree cervical AROM  3. ? Strength: Increase scapular retractor strength to 4+/5  4. ? Function: Pt to report being able to vacuum without an increase in pain   5. Independent with Home Exercise Programs     LTG: (to be met in 20 treatments)  1. Improve score on assessment tool from 38% impaired to 15% impaired, demonstrating an improvement in ADLs  2. Reduce pain levels to 0/10                    Patient goals: To have longer lasting improvement of pain compared to last PT visits    Pt. Education:  [x] Yes  [] No  [] Reviewed Prior HEP/Ed  Method of Education: [x] Verbal  [x] Demo  [] Written  Comprehension of Education:  [x] Verbalizes understanding. [x] Demonstrates understanding.   [] Needs review. [] Demonstrates/verbalizes HEP/Ed previously given. Plan: [x] Continue current frequency toward long and short term goals.           Time In: 1055            Time Out: 1130    Electronically signed by:  Marcela Mckinney PT

## 2021-05-24 ENCOUNTER — APPOINTMENT (OUTPATIENT)
Dept: PHYSICAL THERAPY | Facility: CLINIC | Age: 66
End: 2021-05-24
Payer: MEDICARE

## 2021-06-04 ENCOUNTER — TELEPHONE (OUTPATIENT)
Dept: PRIMARY CARE CLINIC | Age: 66
End: 2021-06-04

## 2021-06-04 DIAGNOSIS — Z12.11 SCREENING FOR COLON CANCER: Primary | ICD-10-CM

## 2021-06-07 ENCOUNTER — HOSPITAL ENCOUNTER (OUTPATIENT)
Dept: PHYSICAL THERAPY | Facility: CLINIC | Age: 66
Setting detail: THERAPIES SERIES
Discharge: HOME | End: 2021-06-07
Payer: MEDICARE

## 2021-06-07 PROCEDURE — 97140 MANUAL THERAPY 1/> REGIONS: CPT

## 2021-06-07 NOTE — PRE-CERTIFICATION NOTE
Medicare Cap   [] Physical Therapy  [] Speech Therapy  [] Occupational therapy  *PT and Speech caps combine      $2100 Limit for PT and Speech combined  $2100 Limit for OT individually  At the beginning of the month where you expect to go over $2100, please add the [address] [place] modifier      Patient Name: Junito Cooley  YOB: 1955    Note:  This is an estimate of charges billed.      Date of [address] Name # units/ charge $$$ charge Daily Total Charge Ongoing Total $$$       Previous therapy this calender year 245.62   3/23 PT eval  Manual 1+2 82.82+21.70+21.70 126.22 371.84   3/31 Manual  Therex 1+1 29.72+21.70 51.42 423.26   4/21 Manual 2 27.29+21.70 48.99 472.25   4/30 Manual 2 27.29+21.70 48.99 524.24   5/3 Manual 2 27.29+21.70 48.99 573.23   5/5 Manual 2 27.29+21.70 48.99 622.22   5/10 Manual 2 27.29+21.70 48.99 671.21   5/12 Manual 2 27.29+21.70 48.99 720.1

## 2021-06-07 NOTE — FLOWSHEET NOTE
cervical paravertebrals C2-C5, L UT muscle belly with muscle twitch noted in mid muscle      Specific Instructions for next treatment: Progress to scapular stabilizers               Treatment Charges: Mins Units   []  Modalities: HP     []  Ther Exercise     [x]  Manual Therapy 25 2   []  Ther Activities     []  Aquatics     []  Vasocompression     [x]  Other: Dry Needling 5 0   Total Treatment time 30 2       Assessment: [x] Progressing toward goals. Continued with manual as listed above, decreased tightness noted post.     [] No change. [] Other:  [] Patient would continue to benefit from skilled physical therapy services in order to: Decrease LUT tightness and correct cervical somatic dysfuncitons, as well as strengthen deep neck flexors and scapular retractors to provide cervical stabilization and longer lasting improvement of symptoms. STG: (to be met in 10 treatments)  1. ? Pain: Decrease pain levels to 5/10 at worst in L sided neck   2. ? ROM: Increase flexibility and AROM limitations throughout to equal bilat to reduce difficulty with ADLs, full painfree cervical AROM  3. ? Strength: Increase scapular retractor strength to 4+/5  4. ? Function: Pt to report being able to vacuum without an increase in pain   5. Independent with Home Exercise Programs     LTG: (to be met in 20 treatments)  1. Improve score on assessment tool from 38% impaired to 15% impaired, demonstrating an improvement in ADLs  2. Reduce pain levels to 0/10                    Patient goals: To have longer lasting improvement of pain compared to last PT visits    Pt. Education:  [x] Yes  [] No  [] Reviewed Prior HEP/Ed  Method of Education: [x] Verbal  [x] Demo  [] Written  Comprehension of Education:  [x] Verbalizes understanding. [x] Demonstrates understanding. [] Needs review. [] Demonstrates/verbalizes HEP/Ed previously given. Plan: [x] Continue current frequency toward long and short term goals.           Time In: 0830 Time Out: 0900    Electronically signed by:  Marcela Mckinney, PT

## 2021-06-10 ENCOUNTER — HOSPITAL ENCOUNTER (OUTPATIENT)
Dept: PHYSICAL THERAPY | Facility: CLINIC | Age: 66
Setting detail: THERAPIES SERIES
Discharge: HOME | End: 2021-06-10
Payer: MEDICARE

## 2021-06-10 PROCEDURE — 97140 MANUAL THERAPY 1/> REGIONS: CPT

## 2021-06-10 PROCEDURE — 97110 THERAPEUTIC EXERCISES: CPT

## 2021-06-10 NOTE — TELEPHONE ENCOUNTER
Pt was contacted in regards to an AWV. .. pt was unable to be reached and was left a vm to call us back.

## 2021-06-10 NOTE — FLOWSHEET NOTE
[] [address]  [address]  [phone]  F: [phone] [] [address]  [facility]   [address]  P: [phone]  F: [phone] [] [address]  Therapy  [address]  P: [phone]  F: [phone] [x] [address]  P: [phone]  F: [phone] [] [address]  [facility]   [address]   [place]: [phone]  F: [phone]      Physical Therapy Daily Treatment Note    Date:  6/10/2021  Patient Name:  Debbie Cruz    :  1955  MRN: [id number]  Physician: Hien Duron CNP              Insurance: Medicare  Medical Diagnosis: DDD Cervical                Rehab Codes: M50.30  Onset date: 20                 Next 's appt.: TBA  Visit# / total visits: 10  /20;  Progress note for Medicare patient due at visit 10     Cancels/No Shows: 0/0    Subjective:    Pain:  [] Yes  [] No Location: L sided neck pain  Pain Rating: (0-10 scale) 1/10  Pain altered Tx:  [] No  [] Yes  Action:  Comments: Pt arrives noting to having increased soreness this date, attributes to doing house work yesterday     Objective:  Exercises:  Exercise   L sided cervical pain     Reps/ Time Weight/ Level Comments             UBE: fwd/bkwd  2'/2'                 *Deep neck flexor 2x10       Seated UT stretch  3x30\"       Levator stretch  3x30\"                 Seated scapular retraction and depression  2x10    HEP    Seated horizontal abd 2x10    HEP    Circuit      2x each    Standing shoulder flex, horizontal abd 10x        Scap retraction 10x                                     Other:Manual: Level 1-2 MFR to L sided UT, levator      HP in supine to cervical region 10' prior to manual- not today     Integrative Dry Needling: L sided longer lasting improvement of pain compared to last PT visits    Pt. Education:  [x] Yes  [] No  [] Reviewed Prior HEP/Ed  Method of Education: [x] Verbal  [x] Demo  [] Written  Comprehension of Education:  [x] Verbalizes understanding. [x] Demonstrates understanding. [] Needs review. [] Demonstrates/verbalizes HEP/Ed previously given. Plan: [x] Continue current frequency toward long and short term goals.           Time In: 5030           Time Out: 0900    Electronically signed by:  Donte Capone, PT

## 2021-06-10 NOTE — PROGRESS NOTES
Physical Therapy      [] [employer] [facility]  [address]  [phone]  F: [phone] [] [address]  [address]   [address]  P: [phone]  F: [phone] [] [address] [facility]  [address] [facility]  P: [phone]  F: [phone] [x] [address]  P: [phone]  F: [phone] [] [address]  [facility]   [address]   [place]: [phone]  F: [phone]      Physical Therapy Progress Note    Date: 6/10/2021      Patient: Jonnie Ford  : 1955  MRN: [id number]    Physician: Sara Nevelyn Halsted, CHJ              NCMRGXDPX: Medicare  Medical Diagnosis: DDD Cervical                Rehab Codes: M50.30  Onset date: 20                 Next Dr's appt.: TBA  Visit# / total visits: 10/20;      Progress note for Medicare patient due at visit 10                               Cancels/No Shows: 0/0     Subjective:    Pain:  []? Yes  []? No   Location: L sided neck pain                Pain Rating: (0-10 scale) 1/10  Pain altered Tx:  []? No  []? Yes  Action:  Comments: Pt arrives noting to having increased soreness this date, attributes to doing house work yesterday       Assessment: [x] Progressing toward goals. Able to progress pt to include easy strengthening including active shoulder ROM as listed above, pt tolerated well without any pain. Will plan to continue with manual and progress strengthening as tolerated. Pt will benefit from continued physical therapy in order to continue with manual therapy as well as progress strengthening per tolerance. [] No change.      [] Other:  [] Patient would continue to benefit from skilled physical therapy services in order to: Decrease LUT tightness and correct cervical somatic

## 2021-06-11 RX ORDER — LISINOPRIL 5 MG/1
TABLET ORAL
Qty: 90 TABLET | Refills: 3 | Status: SHIPPED | OUTPATIENT
Start: 2021-06-11

## 2021-06-15 ENCOUNTER — HOSPITAL ENCOUNTER (OUTPATIENT)
Dept: PHYSICAL THERAPY | Facility: CLINIC | Age: 66
Setting detail: THERAPIES SERIES
Discharge: HOME | End: 2021-06-15
Payer: MEDICARE

## 2021-06-15 NOTE — FLOWSHEET NOTE
[] [address]  [address]    [phone]  F: [phone]   [] [address] [facility] [address]   [address]  P: [phone]  F: [phone]  [] [place]  [address] [facility]  P: [phone]  F: [phone] [x] [address] [place] [address]  P: [phone]  F: [phone]  [] [address]  [address] [facility]   [address]   [place]: [phone]  F: [phone]   [] [address]  [place]: [phone]   F: [phone]     Physical Therapy Cancel/No Show note    Date: 6/15/2021  Patient: Samuel Monsivais  : 1955  MRN: [id number]    Cancels/No Shows to date:     For today's appointment patient:    [x]  Cancelled    [] Rescheduled appointment    [] No-show     Reason given by patient:    []  Patient ill    []  Conflicting appointment    [] No transportation      [] Conflict with work    [] No reason given    [] Weather related    [] COVID-19    [x] Other:      Comments:  Patient stated she will not make appt today. Patient stated she doesn't have any other appointments schedule and for now she will do her exercises at home. She also mentioned that she will be going out of town a lot for the summer so she doesn't know how much she will be able to come in. Patient would like her chart held for a few weeks and she will call back to let us know if she wants to be discharged.       [] Next appointment was confirmed    Electronically signed by: Colette Henderson

## 2021-06-30 ENCOUNTER — TELEPHONE (OUTPATIENT)
Dept: PRIMARY CARE CLINIC | Age: 66
End: 2021-06-30

## 2021-06-30 DIAGNOSIS — Z12.31 BREAST CANCER SCREENING BY MAMMOGRAM: Primary | ICD-10-CM

## 2021-07-02 DIAGNOSIS — J30.1 NON-SEASONAL ALLERGIC RHINITIS DUE TO POLLEN: ICD-10-CM

## 2021-07-06 ENCOUNTER — TELEPHONE (OUTPATIENT)
Dept: PRIMARY CARE CLINIC | Age: 66
End: 2021-07-06

## 2021-07-06 DIAGNOSIS — Z12.31 ENCOUNTER FOR SCREENING MAMMOGRAM FOR MALIGNANT NEOPLASM OF BREAST: Primary | ICD-10-CM

## 2021-07-06 RX ORDER — MELOXICAM 15 MG/1
TABLET ORAL
Qty: 90 TABLET | Refills: 3 | Status: SHIPPED | OUTPATIENT
Start: 2021-07-06

## 2021-07-06 RX ORDER — AZELASTINE HCL 205.5 UG/1
1 SPRAY NASAL DAILY
Qty: 30 ML | Refills: 3 | Status: SHIPPED | OUTPATIENT
Start: 2021-07-06

## 2021-07-16 DIAGNOSIS — Z12.31 ENCOUNTER FOR SCREENING MAMMOGRAM FOR MALIGNANT NEOPLASM OF BREAST: ICD-10-CM

## 2021-11-18 RX ORDER — HYDROCHLOROTHIAZIDE 25 MG/1
TABLET ORAL
Qty: 180 TABLET | Refills: 1 | Status: SHIPPED | OUTPATIENT
Start: 2021-11-18

## 2021-12-29 ENCOUNTER — NURSE ONLY (OUTPATIENT)
Dept: PRIMARY CARE CLINIC | Age: 66
End: 2021-12-29
Payer: MEDICARE

## 2021-12-29 DIAGNOSIS — Z23 NEED FOR PNEUMOCOCCAL VACCINATION: Primary | ICD-10-CM

## 2021-12-29 PROCEDURE — 90732 PPSV23 VACC 2 YRS+ SUBQ/IM: CPT | Performed by: FAMILY MEDICINE

## 2021-12-29 PROCEDURE — G0009 ADMIN PNEUMOCOCCAL VACCINE: HCPCS | Performed by: FAMILY MEDICINE

## 2021-12-29 ASSESSMENT — PATIENT HEALTH QUESTIONNAIRE - PHQ9
SUM OF ALL RESPONSES TO PHQ QUESTIONS 1-9: 0
1. LITTLE INTEREST OR PLEASURE IN DOING THINGS: 0
SUM OF ALL RESPONSES TO PHQ QUESTIONS 1-9: 0
SUM OF ALL RESPONSES TO PHQ QUESTIONS 1-9: 0
2. FEELING DOWN, DEPRESSED OR HOPELESS: 0
SUM OF ALL RESPONSES TO PHQ9 QUESTIONS 1 & 2: 0

## 2022-01-25 DIAGNOSIS — J30.1 NON-SEASONAL ALLERGIC RHINITIS DUE TO POLLEN: ICD-10-CM

## 2022-02-01 RX ORDER — FLUTICASONE PROPIONATE 50 MCG
1 SPRAY, SUSPENSION (ML) NASAL DAILY
Qty: 1 EACH | Refills: 0 | Status: SHIPPED | OUTPATIENT
Start: 2022-02-01